# Patient Record
Sex: MALE | Race: WHITE | NOT HISPANIC OR LATINO
[De-identification: names, ages, dates, MRNs, and addresses within clinical notes are randomized per-mention and may not be internally consistent; named-entity substitution may affect disease eponyms.]

---

## 2021-03-11 ENCOUNTER — APPOINTMENT (OUTPATIENT)
Dept: SURGERY | Facility: CLINIC | Age: 61
End: 2021-03-11
Payer: COMMERCIAL

## 2021-03-11 VITALS
HEIGHT: 67 IN | OXYGEN SATURATION: 98 % | HEART RATE: 67 BPM | WEIGHT: 205 LBS | TEMPERATURE: 98.2 F | BODY MASS INDEX: 32.18 KG/M2 | SYSTOLIC BLOOD PRESSURE: 145 MMHG | DIASTOLIC BLOOD PRESSURE: 92 MMHG

## 2021-03-11 DIAGNOSIS — Z78.9 OTHER SPECIFIED HEALTH STATUS: ICD-10-CM

## 2021-03-11 DIAGNOSIS — Z81.8 FAMILY HISTORY OF OTHER MENTAL AND BEHAVIORAL DISORDERS: ICD-10-CM

## 2021-03-11 DIAGNOSIS — Z80.52 FAMILY HISTORY OF MALIGNANT NEOPLASM OF BLADDER: ICD-10-CM

## 2021-03-11 DIAGNOSIS — Z80.1 FAMILY HISTORY OF MALIGNANT NEOPLASM OF TRACHEA, BRONCHUS AND LUNG: ICD-10-CM

## 2021-03-11 PROCEDURE — 99072 ADDL SUPL MATRL&STAF TM PHE: CPT

## 2021-03-11 PROCEDURE — 19020 MASTOTOMY EXPL DRG ABSC DP: CPT | Mod: 59

## 2021-03-11 PROCEDURE — 99203 OFFICE O/P NEW LOW 30 MIN: CPT | Mod: 25

## 2021-03-11 NOTE — ASSESSMENT
[FreeTextEntry1] : Incision and drainage done with 5 cc of puss and sebum. Wound packed open and DSD applied

## 2021-03-11 NOTE — HISTORY OF PRESENT ILLNESS
[de-identified] : left chest abscess [de-identified] : 60 year old white male who presents c/o pain and swelling of his left chest for the last week. Pt states he has had a  small lump in the area for the past few months but it increased in size and became red over the last week. It is causing him pain but he denies fevers or chills. Without drainage from the area.

## 2021-03-11 NOTE — PHYSICAL EXAM
[JVD] : no jugular venous distention  [Normal Thyroid] : the thyroid was normal [Carotid Bruits] : no carotid bruits [Normal Breath Sounds] : Normal breath sounds [Wheezing] : wheezing was heard [Normal Rate and Rhythm] : normal rate and rhythm [Purpura] : no purpura  [Petechiae] : no petechiae [Skin Ulcer] : no ulcer [Skin Induration] : induration [Alert] : alert [Oriented to Person] : oriented to person [Oriented to Place] : oriented to place [Oriented to Time] : oriented to time [Calm] : calm [de-identified] : well developed white male in no acute distress [de-identified] : noninjected and nonicteric [de-identified] : without adenopathy [de-identified] : 4 by 4 cm left breast abscess [de-identified] : benign [de-identified] : without calf pain or swelling

## 2021-03-18 ENCOUNTER — APPOINTMENT (OUTPATIENT)
Dept: SURGERY | Facility: CLINIC | Age: 61
End: 2021-03-18
Payer: COMMERCIAL

## 2021-03-18 VITALS — TEMPERATURE: 98.2 F

## 2021-03-18 DIAGNOSIS — N61.1 ABSCESS OF THE BREAST AND NIPPLE: ICD-10-CM

## 2021-03-18 PROCEDURE — 99024 POSTOP FOLLOW-UP VISIT: CPT

## 2021-03-18 NOTE — HISTORY OF PRESENT ILLNESS
[de-identified] : left chest abscess s/p incision and drainage on 3/11/21 [de-identified] : Pt improved, decrease swelling and pain, without further drainage, and no fevers or chills.

## 2021-03-18 NOTE — PHYSICAL EXAM
[Normal Breath Sounds] : Normal breath sounds [Normal Heart Sounds] : normal heart sounds [Normal Rate and Rhythm] : normal rate and rhythm [Calm] : calm [de-identified] : well developed male in no acute distress [de-identified] : incision clean and closing, without erythema or drainage [de-identified] : benign

## 2021-03-19 LAB — BACTERIA WND CULT: ABNORMAL

## 2021-05-18 ENCOUNTER — APPOINTMENT (OUTPATIENT)
Dept: SURGERY | Facility: CLINIC | Age: 61
End: 2021-05-18
Payer: COMMERCIAL

## 2021-05-18 VITALS — TEMPERATURE: 99.4 F

## 2021-05-18 PROCEDURE — 99024 POSTOP FOLLOW-UP VISIT: CPT

## 2021-05-18 NOTE — HISTORY OF PRESENT ILLNESS
[de-identified] : left chest abscess s/p incision and drainage on 3/11/21 [de-identified] : pt to see if cyst to his chest has returned, He denies any pain, swelling or drainage.

## 2021-05-18 NOTE — PHYSICAL EXAM
[Normal Breath Sounds] : Normal breath sounds [Normal Heart Sounds] : normal heart sounds [Calm] : calm [de-identified] : well developed male in no acute distress [de-identified] : chest wall with thickened scar, not sure if cyst has returned [de-identified] : benign [de-identified] : without calf pain

## 2021-06-17 ENCOUNTER — APPOINTMENT (OUTPATIENT)
Dept: SURGERY | Facility: CLINIC | Age: 61
End: 2021-06-17
Payer: COMMERCIAL

## 2021-06-17 VITALS — TEMPERATURE: 98.6 F

## 2021-06-17 PROCEDURE — 99072 ADDL SUPL MATRL&STAF TM PHE: CPT

## 2021-06-17 PROCEDURE — 99212 OFFICE O/P EST SF 10 MIN: CPT

## 2021-06-17 NOTE — ASSESSMENT
[FreeTextEntry1] : Recommend elective excision of left breast cyst. All risks, benefits and options discussed.

## 2021-06-17 NOTE — PHYSICAL EXAM
[Normal Breath Sounds] : Normal breath sounds [Normal Heart Sounds] : normal heart sounds [Normal Rate and Rhythm] : normal rate and rhythm [Calm] : calm [de-identified] : without adenopathy [de-identified] : well developed male in no distress [de-identified] : positive for a reaccumulation of left breast sebaceous cyst [de-identified] : benign [de-identified] : without calf pain or swelling

## 2021-06-17 NOTE — HISTORY OF PRESENT ILLNESS
[de-identified] : left chest abscess s/p incision and drainage on 3/11/21 [de-identified] : pt for follow up of a left sebaceous cyst drainage, He does feel a  lump in the area.

## 2021-10-27 ENCOUNTER — OUTPATIENT (OUTPATIENT)
Dept: OUTPATIENT SERVICES | Facility: HOSPITAL | Age: 61
LOS: 1 days | End: 2021-10-27
Payer: COMMERCIAL

## 2021-10-27 DIAGNOSIS — Z98.890 OTHER SPECIFIED POSTPROCEDURAL STATES: Chronic | ICD-10-CM

## 2021-10-27 DIAGNOSIS — N44.00 TORSION OF TESTIS, UNSPECIFIED: Chronic | ICD-10-CM

## 2021-10-27 DIAGNOSIS — Z20.828 CONTACT WITH AND (SUSPECTED) EXPOSURE TO OTHER VIRAL COMMUNICABLE DISEASES: ICD-10-CM

## 2021-10-27 LAB — SARS-COV-2 RNA SPEC QL NAA+PROBE: SIGNIFICANT CHANGE UP

## 2021-10-27 PROCEDURE — U0003: CPT

## 2021-10-27 PROCEDURE — U0005: CPT

## 2021-10-28 ENCOUNTER — TRANSCRIPTION ENCOUNTER (OUTPATIENT)
Age: 61
End: 2021-10-28

## 2021-10-29 ENCOUNTER — RESULT REVIEW (OUTPATIENT)
Age: 61
End: 2021-10-29

## 2021-10-29 ENCOUNTER — APPOINTMENT (OUTPATIENT)
Dept: SURGERY | Facility: HOSPITAL | Age: 61
End: 2021-10-29

## 2021-10-29 ENCOUNTER — OUTPATIENT (OUTPATIENT)
Dept: OUTPATIENT SERVICES | Facility: HOSPITAL | Age: 61
LOS: 1 days | End: 2021-10-29
Payer: COMMERCIAL

## 2021-10-29 DIAGNOSIS — Z98.890 OTHER SPECIFIED POSTPROCEDURAL STATES: Chronic | ICD-10-CM

## 2021-10-29 DIAGNOSIS — N44.00 TORSION OF TESTIS, UNSPECIFIED: Chronic | ICD-10-CM

## 2021-10-29 DIAGNOSIS — D48.1 NEOPLASM OF UNCERTAIN BEHAVIOR OF CONNECTIVE AND OTHER SOFT TISSUE: ICD-10-CM

## 2021-10-29 PROCEDURE — 88304 TISSUE EXAM BY PATHOLOGIST: CPT | Mod: 26

## 2021-10-29 PROCEDURE — 88304 TISSUE EXAM BY PATHOLOGIST: CPT

## 2021-10-29 PROCEDURE — 19120 REMOVAL OF BREAST LESION: CPT | Mod: LT

## 2021-11-02 LAB — SURGICAL PATHOLOGY STUDY: SIGNIFICANT CHANGE UP

## 2021-11-04 ENCOUNTER — APPOINTMENT (OUTPATIENT)
Dept: SURGERY | Facility: CLINIC | Age: 61
End: 2021-11-04
Payer: COMMERCIAL

## 2021-11-04 VITALS — TEMPERATURE: 98.7 F

## 2021-11-04 PROCEDURE — 99024 POSTOP FOLLOW-UP VISIT: CPT

## 2021-11-04 NOTE — HISTORY OF PRESENT ILLNESS
[de-identified] : excision of left breast lesion on 10/29/21 [de-identified] : pt without pain, swelling or drainage.

## 2021-11-04 NOTE — ASSESSMENT
[FreeTextEntry1] : I have discussed with pt that his pathology came back a hypertrophic scar and that he must be followed to make sure he does not develop a keloid

## 2021-11-04 NOTE — PHYSICAL EXAM
[Normal Breath Sounds] : Normal breath sounds [Normal Heart Sounds] : normal heart sounds [Calm] : calm [de-identified] : well developed male in no distress [de-identified] : incision clean and closed, without erythema or swelling [de-identified] : benign

## 2021-11-15 ENCOUNTER — APPOINTMENT (OUTPATIENT)
Dept: SURGERY | Facility: CLINIC | Age: 61
End: 2021-11-15
Payer: COMMERCIAL

## 2021-11-15 VITALS — TEMPERATURE: 97.2 F

## 2021-11-15 DIAGNOSIS — N60.02 SOLITARY CYST OF LEFT BREAST: ICD-10-CM

## 2021-11-15 PROCEDURE — 99024 POSTOP FOLLOW-UP VISIT: CPT

## 2021-11-15 NOTE — HISTORY OF PRESENT ILLNESS
[de-identified] : excision of left breast lesion on 10/29/21 [de-identified] : pt without new complaints, following to make sure keloid does not form.

## 2021-11-15 NOTE — PHYSICAL EXAM
[Normal Breath Sounds] : Normal breath sounds [Normal Heart Sounds] : normal heart sounds [Calm] : calm [de-identified] : well developed male in no distress [de-identified] : incision clean and closed, without keloid

## 2021-12-09 ENCOUNTER — APPOINTMENT (OUTPATIENT)
Dept: SURGERY | Facility: CLINIC | Age: 61
End: 2021-12-09

## 2022-12-15 ENCOUNTER — EMERGENCY (EMERGENCY)
Facility: HOSPITAL | Age: 62
LOS: 1 days | Discharge: HOME | End: 2022-12-15
Attending: EMERGENCY MEDICINE | Admitting: EMERGENCY MEDICINE
Payer: COMMERCIAL

## 2022-12-15 VITALS
TEMPERATURE: 98 F | SYSTOLIC BLOOD PRESSURE: 126 MMHG | DIASTOLIC BLOOD PRESSURE: 87 MMHG | RESPIRATION RATE: 17 BRPM | OXYGEN SATURATION: 98 % | HEART RATE: 85 BPM

## 2022-12-15 VITALS
RESPIRATION RATE: 18 BRPM | HEART RATE: 69 BPM | OXYGEN SATURATION: 99 % | TEMPERATURE: 97 F | DIASTOLIC BLOOD PRESSURE: 98 MMHG | WEIGHT: 205.03 LBS | SYSTOLIC BLOOD PRESSURE: 165 MMHG

## 2022-12-15 DIAGNOSIS — R07.9 CHEST PAIN, UNSPECIFIED: ICD-10-CM

## 2022-12-15 DIAGNOSIS — N44.00 TORSION OF TESTIS, UNSPECIFIED: Chronic | ICD-10-CM

## 2022-12-15 DIAGNOSIS — Z98.890 OTHER SPECIFIED POSTPROCEDURAL STATES: Chronic | ICD-10-CM

## 2022-12-15 LAB
ALBUMIN SERPL ELPH-MCNC: 3.8 G/DL — SIGNIFICANT CHANGE UP (ref 3.3–5)
ALP SERPL-CCNC: 68 U/L — SIGNIFICANT CHANGE UP (ref 40–120)
ALT FLD-CCNC: 38 U/L — SIGNIFICANT CHANGE UP (ref 12–78)
ANION GAP SERPL CALC-SCNC: 4 MMOL/L — LOW (ref 5–17)
AST SERPL-CCNC: 31 U/L — SIGNIFICANT CHANGE UP (ref 15–37)
BASOPHILS # BLD AUTO: 0.06 K/UL — SIGNIFICANT CHANGE UP (ref 0–0.2)
BASOPHILS NFR BLD AUTO: 1.1 % — SIGNIFICANT CHANGE UP (ref 0–2)
BILIRUB SERPL-MCNC: 0.5 MG/DL — SIGNIFICANT CHANGE UP (ref 0.2–1.2)
BUN SERPL-MCNC: 18 MG/DL — SIGNIFICANT CHANGE UP (ref 7–23)
CALCIUM SERPL-MCNC: 9.6 MG/DL — SIGNIFICANT CHANGE UP (ref 8.5–10.1)
CHLORIDE SERPL-SCNC: 108 MMOL/L — SIGNIFICANT CHANGE UP (ref 96–108)
CK MB BLD-MCNC: 1.6 % — SIGNIFICANT CHANGE UP (ref 0–3.5)
CK MB CFR SERPL CALC: 2.7 NG/ML — SIGNIFICANT CHANGE UP (ref 0–3.6)
CK SERPL-CCNC: 166 U/L — SIGNIFICANT CHANGE UP (ref 26–308)
CO2 SERPL-SCNC: 29 MMOL/L — SIGNIFICANT CHANGE UP (ref 22–31)
CREAT SERPL-MCNC: 0.87 MG/DL — SIGNIFICANT CHANGE UP (ref 0.5–1.3)
EGFR: 99 ML/MIN/1.73M2 — SIGNIFICANT CHANGE UP
EOSINOPHIL # BLD AUTO: 0.09 K/UL — SIGNIFICANT CHANGE UP (ref 0–0.5)
EOSINOPHIL NFR BLD AUTO: 1.6 % — SIGNIFICANT CHANGE UP (ref 0–6)
FLUAV AG NPH QL: SIGNIFICANT CHANGE UP
FLUBV AG NPH QL: SIGNIFICANT CHANGE UP
GLUCOSE SERPL-MCNC: 98 MG/DL — SIGNIFICANT CHANGE UP (ref 70–99)
HCT VFR BLD CALC: 44.6 % — SIGNIFICANT CHANGE UP (ref 39–50)
HGB BLD-MCNC: 15.1 G/DL — SIGNIFICANT CHANGE UP (ref 13–17)
IMM GRANULOCYTES NFR BLD AUTO: 0.2 % — SIGNIFICANT CHANGE UP (ref 0–0.9)
LYMPHOCYTES # BLD AUTO: 1.57 K/UL — SIGNIFICANT CHANGE UP (ref 1–3.3)
LYMPHOCYTES # BLD AUTO: 28.3 % — SIGNIFICANT CHANGE UP (ref 13–44)
MCHC RBC-ENTMCNC: 29.7 PG — SIGNIFICANT CHANGE UP (ref 27–34)
MCHC RBC-ENTMCNC: 33.9 GM/DL — SIGNIFICANT CHANGE UP (ref 32–36)
MCV RBC AUTO: 87.6 FL — SIGNIFICANT CHANGE UP (ref 80–100)
MONOCYTES # BLD AUTO: 0.52 K/UL — SIGNIFICANT CHANGE UP (ref 0–0.9)
MONOCYTES NFR BLD AUTO: 9.4 % — SIGNIFICANT CHANGE UP (ref 2–14)
NEUTROPHILS # BLD AUTO: 3.29 K/UL — SIGNIFICANT CHANGE UP (ref 1.8–7.4)
NEUTROPHILS NFR BLD AUTO: 59.4 % — SIGNIFICANT CHANGE UP (ref 43–77)
NRBC # BLD: 0 /100 WBCS — SIGNIFICANT CHANGE UP (ref 0–0)
PLATELET # BLD AUTO: 237 K/UL — SIGNIFICANT CHANGE UP (ref 150–400)
POTASSIUM SERPL-MCNC: 4 MMOL/L — SIGNIFICANT CHANGE UP (ref 3.5–5.3)
POTASSIUM SERPL-SCNC: 4 MMOL/L — SIGNIFICANT CHANGE UP (ref 3.5–5.3)
PROT SERPL-MCNC: 7.3 G/DL — SIGNIFICANT CHANGE UP (ref 6–8.3)
RBC # BLD: 5.09 M/UL — SIGNIFICANT CHANGE UP (ref 4.2–5.8)
RBC # FLD: 11.9 % — SIGNIFICANT CHANGE UP (ref 10.3–14.5)
RSV RNA NPH QL NAA+NON-PROBE: SIGNIFICANT CHANGE UP
SARS-COV-2 RNA SPEC QL NAA+PROBE: SIGNIFICANT CHANGE UP
SODIUM SERPL-SCNC: 141 MMOL/L — SIGNIFICANT CHANGE UP (ref 135–145)
TROPONIN I, HIGH SENSITIVITY RESULT: 13.3 NG/L — SIGNIFICANT CHANGE UP
TROPONIN I, HIGH SENSITIVITY RESULT: 15.1 NG/L — SIGNIFICANT CHANGE UP
WBC # BLD: 5.54 K/UL — SIGNIFICANT CHANGE UP (ref 3.8–10.5)
WBC # FLD AUTO: 5.54 K/UL — SIGNIFICANT CHANGE UP (ref 3.8–10.5)

## 2022-12-15 PROCEDURE — 93005 ELECTROCARDIOGRAM TRACING: CPT

## 2022-12-15 PROCEDURE — 71045 X-RAY EXAM CHEST 1 VIEW: CPT

## 2022-12-15 PROCEDURE — 71045 X-RAY EXAM CHEST 1 VIEW: CPT | Mod: 26

## 2022-12-15 PROCEDURE — 99285 EMERGENCY DEPT VISIT HI MDM: CPT | Mod: 25

## 2022-12-15 PROCEDURE — 36415 COLL VENOUS BLD VENIPUNCTURE: CPT

## 2022-12-15 PROCEDURE — 82550 ASSAY OF CK (CPK): CPT

## 2022-12-15 PROCEDURE — 82553 CREATINE MB FRACTION: CPT

## 2022-12-15 PROCEDURE — 93010 ELECTROCARDIOGRAM REPORT: CPT

## 2022-12-15 PROCEDURE — 99285 EMERGENCY DEPT VISIT HI MDM: CPT

## 2022-12-15 PROCEDURE — 85025 COMPLETE CBC W/AUTO DIFF WBC: CPT

## 2022-12-15 PROCEDURE — 80053 COMPREHEN METABOLIC PANEL: CPT

## 2022-12-15 PROCEDURE — 84484 ASSAY OF TROPONIN QUANT: CPT

## 2022-12-15 PROCEDURE — 87637 SARSCOV2&INF A&B&RSV AMP PRB: CPT

## 2022-12-15 NOTE — ED PROVIDER NOTE - OBJECTIVE STATEMENT
62-year-old female with history of hypertension presents to the ED complaining of midsternal chest pain which occurred 1 hour prior to arrival while at sitting down at work.  Patient states that pain was sharp, constant with radiation into his neck and upper back.  Pain lasted 15 minutes then improved.  Patient still has mild chest discomfort.  Denies fever, chills, shortness of breath, cough, URI symptoms, abdominal pain, nausea, vomiting, syncope, upper or lower extremity weakness or paresthesias, diaphoresis.  No known cardiac history.  No previous cardiac work-up.  Non-smoker.    pmd: Dr. Padilla

## 2022-12-15 NOTE — CONSULT NOTE ADULT - NS ATTEND AMEND GEN_ALL_CORE FT
chest pain with some atypical features  2 sets of troponin are negative and ekg unremarkable  can dc home, and will be set up for coronary CTA  will start asa 81 daily

## 2022-12-15 NOTE — CONSULT NOTE ADULT - ASSESSMENT
62-year-old M with history of hypertension presents to the ED complaining of midsternal chest pain     Chest pain, r/o ACS  - Chest pain occurred around 11:30 pm while sitting down at work.  Patient states that pain was sharp constant pain that started at lower chest which then radiated to upper mid chest and into his neck and upper back. Pain lasted 15 minutes then improved. Pt now reports no symptoms.    - No known cardiac history but have h/o CAD for father and grand father.   - He does reports work stress, but nothing provoked the event.    - Prior cardiac work up almost 10 years back just routine.  - EKG showed SR @ 70. No acute ischemic changes noted.  - Continue Hydrochlorthiazide       Moustapha Cast, MS FNP, AGAP  Nurse Practitioner- Cardiology   Spectra #[phone]   62-year-old M with history of hypertension presents to the ED complaining of midsternal chest pain     Chest pain, r/o ACS  - Chest pain occurred around 11:30 pm while sitting down at work.  Patient states that pain was sharp constant pain that started at lower chest which then radiated to upper mid chest and into his neck and upper back. Pain lasted 15 minutes then improved. Pt now reports no symptoms.    - No known cardiac history but have h/o CAD for father and grand father.   - He does reports work stress, but nothing provoked the event.    - Prior cardiac work up almost 10 years back just routine.  - EKG showed SR @ 70. No acute ischemic changes noted.  - CK  <--166   - Troponin negative x 1. Follow up second set.  - CK is normal, suggesting against acute atherosclerotic plaque rupture.  - Continue Hydrochlorthiazide   - Patient stable from cardiac to be discharged home, if cardiac enzymes negative x 2   - Patient can follow up outpatient for further cardiac care and possibly CT coronaries       Moustapha Cast, MS FNP, [facility]  Nurse Practitioner- Cardiology   Spectra #[phone]

## 2022-12-15 NOTE — ED PROVIDER NOTE - CARE PROVIDER_API CALL
Alex Camp)  Cardiovascular Disease; Internal Medicine  [address], [place] [id number]  Phone: [phone]  Fax: [phone]  Follow Up Time: 1-3 Days

## 2022-12-15 NOTE — ED PROVIDER NOTE - NSICDXFAMILYHX_GEN_ALL_CORE_FT
FAMILY HISTORY:  Father  Still living? No  Hypertension, Age at diagnosis: Age Unknown    Grandparent  Still living? No  Family history of heart attack, Age at diagnosis: Age Unknown  Hypertension, Age at diagnosis: Age Unknown    Uncle  Still living? Yes, Estimated age: 72  Hypertension, Age at diagnosis: Age Unknown

## 2022-12-15 NOTE — ED ADULT NURSE NOTE - OBJECTIVE STATEMENT
Pt. received alert and oriented x3 with chief complain of sudden onset of chest pain radiating up towards throat for last three hours. EKG performed at bedside upon arrival. Pt. placed on continuous cardiac monitor.

## 2022-12-15 NOTE — ED PROVIDER NOTE - NSICDXPASTSURGICALHX_GEN_ALL_CORE_FT
PAST SURGICAL HISTORY:  H/O nasal septoplasty 1981    Testicular torsion pt. can't recall which side-1978

## 2022-12-15 NOTE — ED PROVIDER NOTE - PROGRESS NOTE DETAILS
Reevaluated patient at bedside.  Patient feeling much improved.  Discussed the results of all diagnostic testing in ED and copies of all available reports given.   An opportunity to ask questions was provided.  Discussed the importance of prompt, close medical follow-up.  Patient will return with any changes, concerns or persistent/worsening symptoms.  Understanding of all instructions verbalized. Patient evaluated  by cardiologist Dr. Camp, patient can be dced with close follow up with him. PAtient aware and agreeable with plan.

## 2022-12-15 NOTE — CONSULT NOTE ADULT - SUBJECTIVE AND OBJECTIVE BOX
[facility] - Dhara, Gregoria, Twyla, Otoniel, Chaitanya, Zoë; Office Number: [phone]    Initial Consult Note  CHIEF COMPLAINT: Patient is a 62y old  Male who presents with a chief complaint of chest pain   HPI: 62-year-old female with history of hypertension presents to the ED complaining of midsternal chest pain which occurred 1 hour prior to arrival while at sitting down at work.  Patient states that pain was sharp, constant with radiation into his neck and upper back.  Pain lasted 15 minutes then improved.  Patient still has mild chest discomfort.  Denies fever, chills, shortness of breath, cough, URI symptoms, abdominal pain, nausea, vomiting, syncope, upper or lower extremity weakness or paresthesias, diaphoresis.  No known cardiac history.  No previous cardiac work-up.     In ED, T(F): 97.3, HR: 69, BP: 165/98, RR: 18, SpO2: 99%.     Allergies    levofloxacin (Other)  penicillin (Hives)    Intolerances      PAST MEDICAL & SURGICAL HISTORY:  Prostatitis, chronic  &quot;it flares up a few times a year&quot; last occurrence? &quot;it&#x27;s been awhile, 6, maybe 9 months&quot;      Obese      H/O nasal septoplasty  1981      Testicular torsion  pt. can&#x27;t recall which side-1978        MEDICATIONS  (STANDING):    MEDICATIONS  (PRN):    FAMILY HISTORY:  Family history of heart attack (Grandparent)  Hypertension (Father, Grandparent, Uncle)  Hypertension (Father, Grandparent, Uncle)    SOCIAL HISTORY: No active tobacco, ethanol, or drug abuse.    REVIEW OF SYSTEMS   All other review of systems is negative unless indicated above.    VITAL SIGNS:   Vital Signs Last 24 Hrs  T(C): 36.3 (15 Dec 2022 12:51), Max: 36.3 (15 Dec 2022 12:51)  T(F): 97.3 (15 Dec 2022 12:51), Max: 97.3 (15 Dec 2022 12:51)  HR: 69 (15 Dec 2022 12:51) (69 - 69)  BP: 165/98 (15 Dec 2022 12:51) (165/98 - 165/98)  BP(mean): --  RR: 18 (15 Dec 2022 12:51) (18 - 18)  SpO2: 99% (15 Dec 2022 12:51) (99% - 99%)    Parameters below as of 15 Dec 2022 12:51  Patient On (Oxygen Delivery Method): room air    Physical Exam:  Constitutional: NAD, awake and alert, Well developed   HEENT: Moist Mucous Membranes, Anicteric  Pulmonary: Non-labored, breath sounds are clear bilaterally, No wheezing, rales or rhonchi  Cardiovascular: Regular, S1 and S2, No murmurs, No rubs, gallops or clicks  Gastrointestinal: Bowel Sounds present, soft, nontender.   Lymph: No peripheral edema. No lymphadenopathy.  Skin: No visible rashes or ulcers.  Psych:  Mood & affect appropriate    I&O's Summary      LABS: All Labs Reviewed:                        15.1   5.54  )-----------( 237      ( 15 Dec 2022 13:30 )             44.6    [facility] - Gregoria Jules, Twyla, Otoniel, Chaitanya, Zoë; Office Number: [phone]    Initial Consult Note  CHIEF COMPLAINT: Patient is a 62y old  Male who presents with a chief complaint of chest pain   HPI: 62-year-old female with history of hypertension presents to the ED complaining of midsternal chest pain which occurred 1 hour prior to arrival while at sitting down at work.  Patient states that pain was sharp, constant with radiation into his neck and upper back.  Pain lasted 15 minutes then improved.  Patient still has mild chest discomfort.  Denies fever, chills, shortness of breath, cough, URI symptoms, abdominal pain, nausea, vomiting, syncope, upper or lower extremity weakness or paresthesias, diaphoresis.  No known cardiac history.  No previous cardiac work-up.     In ED, T(F): 97.3, HR: 69, BP: 165/98, RR: 18, SpO2: 99%. EKG showed SR @ 70. No acute ischemic changes noted.     Allergies    levofloxacin (Other)  penicillin (Hives)    Intolerances      PAST MEDICAL & SURGICAL HISTORY:  Prostatitis, chronic  &quot;it flares up a few times a year&quot; last occurrence? &quot;it&#x27;s been awhile, 6, maybe 9 months&quot;      Obese      H/O nasal septoplasty  1981      Testicular torsion  pt. can&#x27;t recall which side-1978        MEDICATIONS  (STANDING):    MEDICATIONS  (PRN):    FAMILY HISTORY:  Family history of heart attack (Grandparent)  Hypertension (Father, Grandparent, Uncle)  Hypertension (Father, Grandparent, Uncle)    SOCIAL HISTORY: No active tobacco, ethanol, or drug abuse.    REVIEW OF SYSTEMS   All other review of systems is negative unless indicated above.    VITAL SIGNS:   Vital Signs Last 24 Hrs  T(C): 36.3 (15 Dec 2022 12:51), Max: 36.3 (15 Dec 2022 12:51)  T(F): 97.3 (15 Dec 2022 12:51), Max: 97.3 (15 Dec 2022 12:51)  HR: 69 (15 Dec 2022 12:51) (69 - 69)  BP: 165/98 (15 Dec 2022 12:51) (165/98 - 165/98)  BP(mean): --  RR: 18 (15 Dec 2022 12:51) (18 - 18)  SpO2: 99% (15 Dec 2022 12:51) (99% - 99%)    Parameters below as of 15 Dec 2022 12:51  Patient On (Oxygen Delivery Method): room air    Physical Exam:  Constitutional: NAD, awake and alert, Well developed   HEENT: Moist Mucous Membranes, Anicteric  Pulmonary: Non-labored, breath sounds are clear bilaterally, No wheezing, rales or rhonchi  Cardiovascular: Regular, S1 and S2, No murmurs, No rubs, gallops or clicks  Gastrointestinal: Bowel Sounds present, soft, nontender.   Lymph: No peripheral edema. No lymphadenopathy.  Skin: No visible rashes or ulcers.  Psych:  Mood & affect appropriate    I&O's Summary      LABS: All Labs Reviewed:                        15.1   5.54  )-----------( 237      ( 15 Dec 2022 13:30 )             44.6    [facility] - Gregoria Jules, Twyla, Otoniel, Chaitanya, Zoë; Office Number: [phone]    Initial Consult Note  CHIEF COMPLAINT: Patient is a 62y old  Male who presents with a chief complaint of chest pain   HPI: 62-year-old M with history of hypertension presents to the ED complaining of midsternal chest pain which occurred around 11:30 pm while sitting down at work.  Patient states that pain was sharp constant pain that started at lower chest which then radiated to upper mid chest and into his neck and upper back. Pain lasted 15 minutes then improved. Pt now reports no symptoms.  No known cardiac history but have h/o CAD for father and grand father. He does reports work stress, but nothing provoked the event.  Prior cardiac work up almost 10 years back just routine. Denies palpitation, orthopnea, PND, dizziness, lightheadedness, leg swelling.     In ED, T(F): 97.3, HR: 69, BP: 165/98, RR: 18, SpO2: 99%. EKG showed SR @ 70. No acute ischemic changes noted. Labs pending     Allergies    levofloxacin (Other)  penicillin (Hives)    Intolerances      PAST MEDICAL & SURGICAL HISTORY:  Prostatitis, chronic  &quot;it flares up a few times a year&quot; last occurrence? &quot;it&#x27;s been awhile, 6, maybe 9 months&quot;      Obese      H/O nasal septoplasty  1981      Testicular torsion  pt. can&#x27;t recall which side-1978        MEDICATIONS  (STANDING):    MEDICATIONS  (PRN):    FAMILY HISTORY:  Family history of heart attack (Grandparent)  Hypertension (Father, Grandparent, Uncle)  Hypertension (Father, Grandparent, Uncle)    SOCIAL HISTORY: No active tobacco, ethanol, or drug abuse.    REVIEW OF SYSTEMS   All other review of systems is negative unless indicated above.    VITAL SIGNS:   Vital Signs Last 24 Hrs  T(C): 36.3 (15 Dec 2022 12:51), Max: 36.3 (15 Dec 2022 12:51)  T(F): 97.3 (15 Dec 2022 12:51), Max: 97.3 (15 Dec 2022 12:51)  HR: 69 (15 Dec 2022 12:51) (69 - 69)  BP: 165/98 (15 Dec 2022 12:51) (165/98 - 165/98)  BP(mean): --  RR: 18 (15 Dec 2022 12:51) (18 - 18)  SpO2: 99% (15 Dec 2022 12:51) (99% - 99%)    Parameters below as of 15 Dec 2022 12:51  Patient On (Oxygen Delivery Method): room air    Physical Exam:  Constitutional: NAD, awake and alert, Well developed   HEENT: Moist Mucous Membranes, Anicteric  Pulmonary: Non-labored, breath sounds are clear bilaterally, No wheezing, rales or rhonchi  Cardiovascular: Regular, S1 and S2, No murmurs, No rubs, gallops or clicks  Gastrointestinal: Bowel Sounds present, soft, nontender.   Lymph: No peripheral edema. No lymphadenopathy.  Skin: No visible rashes or ulcers.  Psych:  Mood & affect appropriate    I&O's Summary      LABS: All Labs Reviewed:                        15.1   5.54  )-----------( 237      ( 15 Dec 2022 13:30 )             44.6

## 2022-12-15 NOTE — ED PROVIDER NOTE - CLINICAL SUMMARY MEDICAL DECISION MAKING FREE TEXT BOX
Patient is a 62-year-old male who presents to the emergency room with a chief complaints of chest pain.  Past medical history of hypertension.  Patient reports that around 1130 while sitting down at work he developed mid sternal chest pain.  Reports that the pain was sharp and constant and radiates to his upper mid chest and into his neck and upper back.  The episode lasted approximately 15 minutes and then improved.  Initially on arrival patient still had some mild chest discomfort but this seems to have resolved.  Denies fevers chills nausea vomiting shortness of breath cough abdominal pain extremity numbness or weakness.  Pain is not pleuritic.  Patient with no calf pain or swelling.  Patient has no known cardiac pathology. Patient does endorse that both his grandfathers had MIs at the age of 60.  On exam patient is lying in bed no acute distress normocephalic atraumatic pupils are equal round and reactive heart is regular rate lungs are clear to auscultation abdomen is soft nontender nondistended patient with a nonfocal neuro exam.  Patient presenting to the emergency room with an episode of sharp stabbing chest pain while at rest.  Low suspicion for cardiac pathology may have been related to gas pocket.  At this time symptoms have resolved.  Given patient's family history of cardiac disease this must be considered on the differential although less likely.  Will obtain screening labs cardiac markers EKG chest x-ray.  Given current climate will obtain viral swab.  Will consult with cardiology.  Labs noted patient with no elevated white count electrolytes within normal first set of cardiac markers negative viral swab negative.  Chest x-ray with no radiographic evidence of active chest disease.  EKG normal sinus.  Cardiology consult noted recommending repeat troponin.  If troponin is negative patient can be discharged with outpatient cardiac follow-up.  Repeat troponin negative patient remains symptom-free at this time stable for discharge home with outpatient cardiac follow-up.

## 2022-12-15 NOTE — ED PROVIDER NOTE - PATIENT PORTAL LINK FT
You can access the FollowMyHealth Patient Portal offered by [facility] by registering at the following website: [email or web address]. By joining [employer]’s FollowMyHealth portal, you will also be able to view your health information using other applications (apps) compatible with our system.

## 2022-12-15 NOTE — ED ADULT NURSE NOTE - HIV OFFER
Patient needs pre-op exam for EGD on 12/4/20 with  with MAC sedation for GERD/dysphagia and H&P within 30 days of procedure.     Place orders needed   Opt out

## 2022-12-15 NOTE — ED PROVIDER NOTE - NSICDXPASTMEDICALHX_GEN_ALL_CORE_FT
PAST MEDICAL HISTORY:  Obese     Prostatitis, chronic "it flares up a few times a year" last occurrence? "it's been awhile, 6, maybe 9 months"

## 2022-12-22 ENCOUNTER — APPOINTMENT (OUTPATIENT)
Dept: CT IMAGING | Facility: CLINIC | Age: 62
End: 2022-12-22
Payer: COMMERCIAL

## 2022-12-22 PROCEDURE — 75574 CT ANGIO HRT W/3D IMAGE: CPT

## 2022-12-30 ENCOUNTER — NON-APPOINTMENT (OUTPATIENT)
Age: 62
End: 2022-12-30

## 2022-12-30 ENCOUNTER — APPOINTMENT (OUTPATIENT)
Dept: CARDIOLOGY | Facility: CLINIC | Age: 62
End: 2022-12-30
Payer: COMMERCIAL

## 2022-12-30 VITALS
DIASTOLIC BLOOD PRESSURE: 85 MMHG | HEART RATE: 73 BPM | BODY MASS INDEX: 32.18 KG/M2 | HEIGHT: 67 IN | WEIGHT: 205 LBS | OXYGEN SATURATION: 100 % | SYSTOLIC BLOOD PRESSURE: 149 MMHG

## 2022-12-30 VITALS — SYSTOLIC BLOOD PRESSURE: 130 MMHG | DIASTOLIC BLOOD PRESSURE: 82 MMHG

## 2022-12-30 DIAGNOSIS — R07.89 OTHER CHEST PAIN: ICD-10-CM

## 2022-12-30 DIAGNOSIS — I10 ESSENTIAL (PRIMARY) HYPERTENSION: ICD-10-CM

## 2022-12-30 PROCEDURE — 93000 ELECTROCARDIOGRAM COMPLETE: CPT

## 2022-12-30 PROCEDURE — 99214 OFFICE O/P EST MOD 30 MIN: CPT | Mod: 25

## 2022-12-30 PROCEDURE — 93880 EXTRACRANIAL BILAT STUDY: CPT

## 2022-12-30 RX ORDER — DOXYCYCLINE 100 MG/1
100 CAPSULE ORAL TWICE DAILY
Qty: 14 | Refills: 0 | Status: DISCONTINUED | COMMUNITY
Start: 2021-03-11 | End: 2022-12-30

## 2022-12-30 RX ORDER — HYDROCHLOROTHIAZIDE 12.5 MG/1
12.5 CAPSULE ORAL
Refills: 0 | Status: ACTIVE | COMMUNITY

## 2022-12-30 NOTE — HISTORY OF PRESENT ILLNESS
[FreeTextEntry1] : Sanjeev is a 62-year-old male here for posthospitalization follow-up.\par \par His past medical history is notable for hypertension.  He does have a family history of CVA, and CAD in grandparents.  He is a non-smoker, and has no toxic habits.  His only medication is hydrochlorothiazide.\par \par On 12/15, he presented to the emergency room with chest pain.  There an EKG and blood work were unremarkable.  I subsequently set him up for a coronary CTA, which demonstrated a calcium score of 0, and only scattered minimal stenosis secondary to noncalcified plaque.\par \par He is doing well.  He knows that he needs to lose weight.  He has no chest pain, difficulty breathing, or palpitations.  He is doing push ups and has no worrisome symptoms.\par \par

## 2022-12-30 NOTE — DISCUSSION/SUMMARY
[EKG obtained to assist in diagnosis and management of assessed problem(s)] : EKG obtained to assist in diagnosis and management of assessed problem(s) [FreeTextEntry1] : Sanjeev recently presented with chest pain, with a normal evaluation.  His coronary CTA demonstrated a calcium score of 0, and only scattered minimal noncalcified plaque.\par \par He feels well today, without worrisome symptoms.  His blood pressure is mildly elevated, though improved on repeat evaluation.  His EKG demonstrates a sinus rhythm, without obvious ischemia or chamber enlargement.\par \par We discussed the importance of diet, exercise, and weight loss in detail.  This will improve his blood pressure and overall cardiovascular risk.  There may also be a component of sleep apnea, contributing to all of this.  I have requested a copy of his most recent blood work.  We will also do a carotid Doppler, for further risk stratification.\par We will speak after the above testing, and arrange follow-up.  If no issues, I will see him again in 6 months.

## 2023-05-21 ENCOUNTER — INPATIENT (INPATIENT)
Facility: HOSPITAL | Age: 63
LOS: 1 days | Discharge: HOME | DRG: 419 | End: 2023-05-23
Attending: SURGERY | Admitting: SURGERY
Payer: COMMERCIAL

## 2023-05-21 ENCOUNTER — TRANSCRIPTION ENCOUNTER (OUTPATIENT)
Age: 63
End: 2023-05-21

## 2023-05-21 VITALS
HEIGHT: 67 IN | DIASTOLIC BLOOD PRESSURE: 67 MMHG | HEART RATE: 77 BPM | WEIGHT: 210.1 LBS | OXYGEN SATURATION: 99 % | TEMPERATURE: 98 F | SYSTOLIC BLOOD PRESSURE: 142 MMHG | RESPIRATION RATE: 22 BRPM

## 2023-05-21 DIAGNOSIS — K80.50 CALCULUS OF BILE DUCT WITHOUT CHOLANGITIS OR CHOLECYSTITIS WITHOUT OBSTRUCTION: ICD-10-CM

## 2023-05-21 DIAGNOSIS — N44.00 TORSION OF TESTIS, UNSPECIFIED: Chronic | ICD-10-CM

## 2023-05-21 DIAGNOSIS — K80.20 CALCULUS OF GALLBLADDER WITHOUT CHOLECYSTITIS WITHOUT OBSTRUCTION: ICD-10-CM

## 2023-05-21 DIAGNOSIS — Z98.890 OTHER SPECIFIED POSTPROCEDURAL STATES: Chronic | ICD-10-CM

## 2023-05-21 LAB
ALBUMIN SERPL ELPH-MCNC: 4.2 G/DL — SIGNIFICANT CHANGE UP (ref 3.3–5)
ALP SERPL-CCNC: 75 U/L — SIGNIFICANT CHANGE UP (ref 40–120)
ALT FLD-CCNC: 47 U/L — SIGNIFICANT CHANGE UP (ref 12–78)
ANION GAP SERPL CALC-SCNC: 8 MMOL/L — SIGNIFICANT CHANGE UP (ref 5–17)
AST SERPL-CCNC: 32 U/L — SIGNIFICANT CHANGE UP (ref 15–37)
BASOPHILS # BLD AUTO: 0.03 K/UL — SIGNIFICANT CHANGE UP (ref 0–0.2)
BASOPHILS NFR BLD AUTO: 0.2 % — SIGNIFICANT CHANGE UP (ref 0–2)
BILIRUB SERPL-MCNC: 0.7 MG/DL — SIGNIFICANT CHANGE UP (ref 0.2–1.2)
BUN SERPL-MCNC: 18 MG/DL — SIGNIFICANT CHANGE UP (ref 7–23)
CALCIUM SERPL-MCNC: 10.1 MG/DL — SIGNIFICANT CHANGE UP (ref 8.5–10.1)
CHLORIDE SERPL-SCNC: 107 MMOL/L — SIGNIFICANT CHANGE UP (ref 96–108)
CO2 SERPL-SCNC: 25 MMOL/L — SIGNIFICANT CHANGE UP (ref 22–31)
CREAT SERPL-MCNC: 0.93 MG/DL — SIGNIFICANT CHANGE UP (ref 0.5–1.3)
EGFR: 93 ML/MIN/1.73M2 — SIGNIFICANT CHANGE UP
EOSINOPHIL # BLD AUTO: 0 K/UL — SIGNIFICANT CHANGE UP (ref 0–0.5)
EOSINOPHIL NFR BLD AUTO: 0 % — SIGNIFICANT CHANGE UP (ref 0–6)
GLUCOSE SERPL-MCNC: 165 MG/DL — HIGH (ref 70–99)
HCT VFR BLD CALC: 43.3 % — SIGNIFICANT CHANGE UP (ref 39–50)
HGB BLD-MCNC: 14.9 G/DL — SIGNIFICANT CHANGE UP (ref 13–17)
IMM GRANULOCYTES NFR BLD AUTO: 0.5 % — SIGNIFICANT CHANGE UP (ref 0–0.9)
LIDOCAIN IGE QN: 174 U/L — SIGNIFICANT CHANGE UP (ref 73–393)
LYMPHOCYTES # BLD AUTO: 0.7 K/UL — LOW (ref 1–3.3)
LYMPHOCYTES # BLD AUTO: 4.9 % — LOW (ref 13–44)
MAGNESIUM SERPL-MCNC: 1.9 MG/DL — SIGNIFICANT CHANGE UP (ref 1.6–2.6)
MCHC RBC-ENTMCNC: 29.4 PG — SIGNIFICANT CHANGE UP (ref 27–34)
MCHC RBC-ENTMCNC: 34.4 GM/DL — SIGNIFICANT CHANGE UP (ref 32–36)
MCV RBC AUTO: 85.4 FL — SIGNIFICANT CHANGE UP (ref 80–100)
MONOCYTES # BLD AUTO: 0.59 K/UL — SIGNIFICANT CHANGE UP (ref 0–0.9)
MONOCYTES NFR BLD AUTO: 4.1 % — SIGNIFICANT CHANGE UP (ref 2–14)
NEUTROPHILS # BLD AUTO: 12.83 K/UL — HIGH (ref 1.8–7.4)
NEUTROPHILS NFR BLD AUTO: 90.3 % — HIGH (ref 43–77)
NRBC # BLD: 0 /100 WBCS — SIGNIFICANT CHANGE UP (ref 0–0)
PLATELET # BLD AUTO: 233 K/UL — SIGNIFICANT CHANGE UP (ref 150–400)
POTASSIUM SERPL-MCNC: 3.7 MMOL/L — SIGNIFICANT CHANGE UP (ref 3.5–5.3)
POTASSIUM SERPL-SCNC: 3.7 MMOL/L — SIGNIFICANT CHANGE UP (ref 3.5–5.3)
PROT SERPL-MCNC: 8.1 G/DL — SIGNIFICANT CHANGE UP (ref 6–8.3)
RBC # BLD: 5.07 M/UL — SIGNIFICANT CHANGE UP (ref 4.2–5.8)
RBC # FLD: 12.4 % — SIGNIFICANT CHANGE UP (ref 10.3–14.5)
SODIUM SERPL-SCNC: 140 MMOL/L — SIGNIFICANT CHANGE UP (ref 135–145)
TROPONIN I, HIGH SENSITIVITY RESULT: 14.3 NG/L — SIGNIFICANT CHANGE UP
WBC # BLD: 14.22 K/UL — HIGH (ref 3.8–10.5)
WBC # FLD AUTO: 14.22 K/UL — HIGH (ref 3.8–10.5)

## 2023-05-21 PROCEDURE — 99221 1ST HOSP IP/OBS SF/LOW 40: CPT

## 2023-05-21 PROCEDURE — 74177 CT ABD & PELVIS W/CONTRAST: CPT | Mod: 26,MA

## 2023-05-21 PROCEDURE — 99285 EMERGENCY DEPT VISIT HI MDM: CPT

## 2023-05-21 PROCEDURE — 76705 ECHO EXAM OF ABDOMEN: CPT | Mod: 26

## 2023-05-21 PROCEDURE — 71045 X-RAY EXAM CHEST 1 VIEW: CPT | Mod: 26

## 2023-05-21 RX ORDER — SODIUM CHLORIDE 9 MG/ML
1000 INJECTION INTRAMUSCULAR; INTRAVENOUS; SUBCUTANEOUS ONCE
Refills: 0 | Status: COMPLETED | OUTPATIENT
Start: 2023-05-21 | End: 2023-05-21

## 2023-05-21 RX ORDER — ONDANSETRON 8 MG/1
4 TABLET, FILM COATED ORAL EVERY 4 HOURS
Refills: 0 | Status: DISCONTINUED | OUTPATIENT
Start: 2023-05-21 | End: 2023-05-21

## 2023-05-21 RX ORDER — ACETAMINOPHEN 500 MG
1000 TABLET ORAL EVERY 6 HOURS
Refills: 0 | Status: COMPLETED | OUTPATIENT
Start: 2023-05-21 | End: 2023-05-22

## 2023-05-21 RX ORDER — HYDROCHLOROTHIAZIDE 25 MG
1 TABLET ORAL
Refills: 0 | DISCHARGE

## 2023-05-21 RX ORDER — HYDROMORPHONE HYDROCHLORIDE 2 MG/ML
0.5 INJECTION INTRAMUSCULAR; INTRAVENOUS; SUBCUTANEOUS EVERY 4 HOURS
Refills: 0 | Status: DISCONTINUED | OUTPATIENT
Start: 2023-05-21 | End: 2023-05-22

## 2023-05-21 RX ORDER — MORPHINE SULFATE 50 MG/1
4 CAPSULE, EXTENDED RELEASE ORAL ONCE
Refills: 0 | Status: DISCONTINUED | OUTPATIENT
Start: 2023-05-21 | End: 2023-05-21

## 2023-05-21 RX ORDER — OXYCODONE AND ACETAMINOPHEN 5; 325 MG/1; MG/1
1 TABLET ORAL EVERY 6 HOURS
Refills: 0 | Status: DISCONTINUED | OUTPATIENT
Start: 2023-05-21 | End: 2023-05-22

## 2023-05-21 RX ORDER — ACETAMINOPHEN 500 MG
1000 TABLET ORAL EVERY 6 HOURS
Refills: 0 | Status: COMPLETED | OUTPATIENT
Start: 2023-05-21 | End: 2023-05-21

## 2023-05-21 RX ORDER — ONDANSETRON 8 MG/1
4 TABLET, FILM COATED ORAL ONCE
Refills: 0 | Status: COMPLETED | OUTPATIENT
Start: 2023-05-21 | End: 2023-05-21

## 2023-05-21 RX ORDER — SODIUM CHLORIDE 9 MG/ML
1000 INJECTION, SOLUTION INTRAVENOUS
Refills: 0 | Status: DISCONTINUED | OUTPATIENT
Start: 2023-05-21 | End: 2023-05-22

## 2023-05-21 RX ORDER — MEROPENEM 1 G/30ML
1000 INJECTION INTRAVENOUS EVERY 8 HOURS
Refills: 0 | Status: DISCONTINUED | OUTPATIENT
Start: 2023-05-21 | End: 2023-05-22

## 2023-05-21 RX ORDER — DIPHENHYDRAMINE HCL 50 MG
25 CAPSULE ORAL EVERY 12 HOURS
Refills: 0 | Status: DISCONTINUED | OUTPATIENT
Start: 2023-05-21 | End: 2023-05-22

## 2023-05-21 RX ORDER — ACETAMINOPHEN 500 MG
1000 TABLET ORAL EVERY 6 HOURS
Refills: 0 | Status: DISCONTINUED | OUTPATIENT
Start: 2023-05-21 | End: 2023-05-22

## 2023-05-21 RX ORDER — ONDANSETRON 8 MG/1
4 TABLET, FILM COATED ORAL EVERY 4 HOURS
Refills: 0 | Status: DISCONTINUED | OUTPATIENT
Start: 2023-05-21 | End: 2023-05-22

## 2023-05-21 RX ORDER — HYDROMORPHONE HYDROCHLORIDE 2 MG/ML
0.5 INJECTION INTRAMUSCULAR; INTRAVENOUS; SUBCUTANEOUS EVERY 6 HOURS
Refills: 0 | Status: DISCONTINUED | OUTPATIENT
Start: 2023-05-21 | End: 2023-05-21

## 2023-05-21 RX ORDER — HYDROMORPHONE HYDROCHLORIDE 2 MG/ML
0.25 INJECTION INTRAMUSCULAR; INTRAVENOUS; SUBCUTANEOUS ONCE
Refills: 0 | Status: DISCONTINUED | OUTPATIENT
Start: 2023-05-21 | End: 2023-05-21

## 2023-05-21 RX ORDER — MEROPENEM 1 G/30ML
1000 INJECTION INTRAVENOUS ONCE
Refills: 0 | Status: COMPLETED | OUTPATIENT
Start: 2023-05-21 | End: 2023-05-21

## 2023-05-21 RX ORDER — ACETAMINOPHEN 500 MG
1000 TABLET ORAL ONCE
Refills: 0 | Status: COMPLETED | OUTPATIENT
Start: 2023-05-21 | End: 2023-05-21

## 2023-05-21 RX ADMIN — SODIUM CHLORIDE 1000 MILLILITER(S): 9 INJECTION INTRAMUSCULAR; INTRAVENOUS; SUBCUTANEOUS at 09:33

## 2023-05-21 RX ADMIN — MEROPENEM 1000 MILLIGRAM(S): 1 INJECTION INTRAVENOUS at 13:37

## 2023-05-21 RX ADMIN — HYDROMORPHONE HYDROCHLORIDE 0.5 MILLIGRAM(S): 2 INJECTION INTRAMUSCULAR; INTRAVENOUS; SUBCUTANEOUS at 13:05

## 2023-05-21 RX ADMIN — HYDROMORPHONE HYDROCHLORIDE 0.5 MILLIGRAM(S): 2 INJECTION INTRAMUSCULAR; INTRAVENOUS; SUBCUTANEOUS at 22:25

## 2023-05-21 RX ADMIN — Medication 400 MILLIGRAM(S): at 23:37

## 2023-05-21 RX ADMIN — Medication 1000 MILLIGRAM(S): at 09:33

## 2023-05-21 RX ADMIN — ONDANSETRON 4 MILLIGRAM(S): 8 TABLET, FILM COATED ORAL at 12:49

## 2023-05-21 RX ADMIN — HYDROMORPHONE HYDROCHLORIDE 0.5 MILLIGRAM(S): 2 INJECTION INTRAMUSCULAR; INTRAVENOUS; SUBCUTANEOUS at 18:29

## 2023-05-21 RX ADMIN — Medication 400 MILLIGRAM(S): at 08:33

## 2023-05-21 RX ADMIN — Medication 1000 MILLIGRAM(S): at 18:23

## 2023-05-21 RX ADMIN — MEROPENEM 100 MILLIGRAM(S): 1 INJECTION INTRAVENOUS at 13:32

## 2023-05-21 RX ADMIN — HYDROMORPHONE HYDROCHLORIDE 0.25 MILLIGRAM(S): 2 INJECTION INTRAMUSCULAR; INTRAVENOUS; SUBCUTANEOUS at 16:39

## 2023-05-21 RX ADMIN — HYDROMORPHONE HYDROCHLORIDE 0.5 MILLIGRAM(S): 2 INJECTION INTRAMUSCULAR; INTRAVENOUS; SUBCUTANEOUS at 18:56

## 2023-05-21 RX ADMIN — SODIUM CHLORIDE 75 MILLILITER(S): 9 INJECTION, SOLUTION INTRAVENOUS at 13:19

## 2023-05-21 RX ADMIN — ONDANSETRON 4 MILLIGRAM(S): 8 TABLET, FILM COATED ORAL at 09:41

## 2023-05-21 RX ADMIN — HYDROMORPHONE HYDROCHLORIDE 0.5 MILLIGRAM(S): 2 INJECTION INTRAMUSCULAR; INTRAVENOUS; SUBCUTANEOUS at 12:50

## 2023-05-21 RX ADMIN — SODIUM CHLORIDE 2000 MILLILITER(S): 9 INJECTION INTRAMUSCULAR; INTRAVENOUS; SUBCUTANEOUS at 08:33

## 2023-05-21 RX ADMIN — MORPHINE SULFATE 4 MILLIGRAM(S): 50 CAPSULE, EXTENDED RELEASE ORAL at 09:41

## 2023-05-21 RX ADMIN — HYDROMORPHONE HYDROCHLORIDE 0.5 MILLIGRAM(S): 2 INJECTION INTRAMUSCULAR; INTRAVENOUS; SUBCUTANEOUS at 22:45

## 2023-05-21 RX ADMIN — MEROPENEM 100 MILLIGRAM(S): 1 INJECTION INTRAVENOUS at 21:43

## 2023-05-21 RX ADMIN — HYDROMORPHONE HYDROCHLORIDE 0.25 MILLIGRAM(S): 2 INJECTION INTRAMUSCULAR; INTRAVENOUS; SUBCUTANEOUS at 15:52

## 2023-05-21 RX ADMIN — MORPHINE SULFATE 4 MILLIGRAM(S): 50 CAPSULE, EXTENDED RELEASE ORAL at 12:43

## 2023-05-21 RX ADMIN — Medication 400 MILLIGRAM(S): at 17:23

## 2023-05-21 NOTE — ED PROVIDER NOTE - CLINICAL SUMMARY MEDICAL DECISION MAKING FREE TEXT BOX
Concern for biliary pathology versus pancreatitis given the localization of his symptoms.  Possible UTI versus renal stone given his polyuria.  History and exam is not consistent with SBO or appendicitis.  Plan to check lab work, provide medications, CT scan.

## 2023-05-21 NOTE — CONSULT NOTE ADULT - ASSESSMENT
63 year old M pmhx HTN, prostatitis, who presented with acute worsening RUQ pain since last night. Found to have acute cholecystitis. Leukocytosis of 14 likely reactive from this. No fevers and LFT's unrevealing. Has hx of penicillin allergy (hives) and adverse reaction to levofloxacin.    Discussed with patient benefits vs risks for trial of carbapenem, and its low cross-reactivity with penicillin, and he is agreeable with continuing meropenem. He is refusing to take any quinolone type of antibiotic.    -suggest meropenem 1g IV q8h, monitor for any reaction  -monitor WBC    Thank you for courtesy of this consult.     Will follow.  Discussed with ZACHARY Hanna MD  Division of Infectious Diseases   Cell [phone] between 8am and 6pm   After 6pm and weekends please call ID service at [phone].   
This is a 63y year old M pmhx HTN presents with 12h of RUQ pain and N/V. WBC14. LFTs WNL. US and CT show gallstones.

## 2023-05-21 NOTE — ED ADULT TRIAGE NOTE - CHIEF COMPLAINT QUOTE
Pt ambulatory to triage c/o RUQ abd pain since 11pm last night.  Pt is unable to sit still in triage, pacing at this time due to pain.  Pt reports ~ 10 episode of vomiting "bile" since onset of symptoms.  Denies diarrhea, abd soft, tender only to RUQ at this time, pain does not radiate to flank though reports "im inflamed down there, it does hurt to pee".   Abd soft.   Pt denies fevers.   Pt states he had salmon at a restaurant yesterday but no one else with similar symptoms. BN

## 2023-05-21 NOTE — ED PROVIDER NOTE - PROGRESS NOTE DETAILS
Patient seen by surgical team, recommend admission to their service for biliary colic.  Jose R Serna MD.

## 2023-05-21 NOTE — CONSULT NOTE ADULT - PROBLEM SELECTOR RECOMMENDATION 9
-Discussed with Dr. Vela, will be in to evaluate patient to decide conservative mgmt vs. OR   -NPO, IVF  -Pain management prn   -Anti-emetics prn   -STAT T&S and coags  -IV antibiotic reccs per pharmacy: merrem given PCN and fluoroquinolone allergy, mandatory ID consult placed     Spectralink: Ext: [phone]

## 2023-05-21 NOTE — H&P ADULT - NSHPLABSRESULTS_GEN_ALL_CORE
LABS:                        14.9   14.22 )-----------( 233      ( 21 May 2023 08:35 )             43.3     05-21    140  |  107  |  18  ----------------------------<  165<H>  3.7   |  25  |  0.93    Ca    10.1      21 May 2023 08:35  Mg     1.9     05-21    TPro  8.1  /  Alb  4.2  /  TBili  0.7  /  DBili  x   /  AST  32  /  ALT  47  /  AlkPhos  75  05-21      RADIOLOGY & ADDITIONAL TESTS:  < from: CT Abdomen and Pelvis w/ IV Cont (05.21.23 @ 09:56) >    FINDINGS:  LOWER CHEST: Within normal limits.    LIVER: Mild hepatic steatosis. Subcentimeter hypodensity in the right   hepatic lobe too small to characterize.  BILE DUCTS: Normal caliber.  GALLBLADDER: Gallstones within a mildly distended gallbladder.  SPLEEN: Within normal limits.  PANCREAS: Within normal limits.  ADRENALS: Within normal limits.  KIDNEYS/URETERS: Kidneys enhance bilaterally and symmetrically without   hydronephrosis. Nonspecific bilateral perinephric stranding. No renal   mass or renalcalculus bilaterally. The ureters are unremarkable.    BLADDER: Within normal limits.  REPRODUCTIVE ORGANS: Prostate within normal limits.    BOWEL: No bowel obstruction. Appendix is normal. Scattered colonic   diverticulosis without diverticulitis.  PERITONEUM: No ascites.  VESSELS: Within normal limits.  RETROPERITONEUM/LYMPH NODES: No lymphadenopathy.  ABDOMINAL WALL: Within normal limits.  BONES: Degenerative changes.    IMPRESSION:  Gallstones within a distended gallbladder.    No bowel obstruction or gross bowel wall thickening. Normal appendix.   Colonic diverticulosis without diverticulitis.    < end of copied text >    < from: US Abdomen Upper Quadrant Right (05.21.23 @ 11:05) >    FINDINGS:  Liver: Hepatic steatosis. Normal echotexture. No focal mass.  Bile ducts: Normal caliber. Common bile duct measures 0.5 cm..  Gallbladder: Cholelithiasis. No gallbladder wall thickening. No   sonographic Jacques sign.  Pancreas: Visualized portions are within normal limits.  Right kidney: 11 cm. No hydronephrosis.  Ascites: None.  IVC: Visualized portions are within normal limits.    IMPRESSION:  Cholelithiasis without secondary sonographic findings to definitively   suggest acute cholecystitis.        --- End of Report --  < end of copied text >

## 2023-05-21 NOTE — H&P ADULT - HISTORY OF PRESENT ILLNESS
This is a 63 year old M pmhx HTN, no significant surgical history, presenting with complaint of approx 12h of worsening RUQ pain, localized without radiation. Has not tried any analgesia or therapeutic measures for relief. Onset was a few hours after eating dinner which was his last meal. Unable to tolerate PO as of this morning 2/t nausea and multiple episodes of bilious, non-bloody vomiting. He denies ever experiencing symptoms such as these in the past. He has seen a GI doctor in the past, reports almost 10 years ago for an endoscopy and colonoscopy which he reports both were "normal." Continues to pass gas and have subjectively normal BMs. Denies fever/chills/sweats.

## 2023-05-21 NOTE — ED PROVIDER NOTE - PHYSICAL EXAMINATION
Constitutional: Awake, Alert, appears uncomfortable  HEAD: Normocephalic, atraumatic.   EYES: PERRL, EOM intact, conjunctiva and sclera are clear bilaterally.  ENT: External ears normal. No rhinorrhea, no tracheal deviation   NECK: Supple, non-tender  CARDIOVASCULAR: regular rate and rhythm.  RESPIRATORY: Normal respiratory effort; breath sounds CTAB, no wheezes, rhonchi, or rales. Speaking in full sentences. No accessory muscle use.   ABDOMEN: Soft; epigastric and RUQ TTP, non-distended. No rebound or guarding.   MSK:  no lower extremity edema, no deformities  SKIN: Warm, dry  NEURO: A&O x3. Sensory and motor functions are grossly intact. Speech is normal. No facial droop.  PSYCH: Appearance and judgement seem appropriate for gender and age.

## 2023-05-21 NOTE — PATIENT PROFILE ADULT - HAVE YOU HAD COVID IN THE LAST 60 DAYS?
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  Is been interrogated and evaluated in July by .  So no need to have been any interrogation today will allow Dr. Carson to manage this.   No

## 2023-05-21 NOTE — H&P ADULT - NSHPPHYSICALEXAM_GEN_ALL_CORE
GENERAL: Well developed, visibly uncomfortable   HEAD:  Atraumatic, normocephalic   CHEST/LUNG: Unlabored breathing   HEART: Regular rate and rhythm from previous vitals   ABDOMEN: Softly distended, +RUQ tenderness, +Jacques's, no guarding or rebound   NEUROLOGY: responding appropriately, no focal deficits

## 2023-05-21 NOTE — H&P ADULT - NSHPREVIEWOFSYSTEMS_GEN_ALL_CORE
Constitutional: Denies fever, fatigue or weight loss.  Skin: Denies rash.  Eyes: Denies recent vision problems or eye pain.  ENT: Denies congestion, ear pain, or sore throat.  Endocrine: Denies thyroid problems.  Cardiovascular: Denies chest pain or palpation.  Respiratory: Denies cough, shortness of breath, congestion, or wheezing.  Gastrointestinal: + abdominal pain, N/V, denies constipation, diarrhea, BRBPR, melena   Genitourinary: Denies dysuria.  Musculoskeletal: Denies joint swelling.  Neurologic: Denies headache.

## 2023-05-21 NOTE — H&P ADULT - ASSESSMENT
63y year old M pmhx HTN presents with 12h of RUQ pain and N/V. WBC14. LFTs WNL. US and CT show gallstones.

## 2023-05-21 NOTE — H&P ADULT - NSICDXPASTMEDICALHX_GEN_ALL_CORE_FT
PAST MEDICAL HISTORY:  Hypertension     Obese     Prostatitis, chronic "it flares up a few times a year" last occurrence? "it's been awhile, 6, maybe 9 months"

## 2023-05-21 NOTE — CONSULT NOTE ADULT - SUBJECTIVE AND OBJECTIVE BOX
[facility]  INFECTIOUS DISEASES - Rahat Paula, [address], [place] [address]  Tel: [phone]     Fax: [phone]  =======================================================    [facility]-[id number]  RAYRAY GUY     CC: Patient is a 63y old  Male who presents with a chief complaint of abdominal pain    HPI:  63 year old M pmhx HTN, prostatitis, who presented with acute worsening RUQ pain since last night. Associated with nausea and multiple episodes of bilious, non-bloody vomiting. He denies fevers, chills, diarrhea. Denies any sick contacts. Reports remote hx of hives after taking high doses of penicillin for Lyme, but no SOB or facial swelling. Also said he developed hx of shoulder injury related to levofloxacin.      PAST MEDICAL & SURGICAL HISTORY:  Prostatitis, chronic  "it flares up a few times a year" last occurrence? "it's been awhile, 6, maybe 9 months"      Obese      H/O nasal septoplasty  1981      Testicular torsion  pt. can't recall which side-1978          Social Hx:     FAMILY HISTORY:  Family history of heart attack (Grandparent)    Hypertension (Father, Grandparent, Uncle)        Allergies    penicillin (Hives)  levofloxacin (Other)    Intolerances        Antibiotics:  MEDICATIONS  (STANDING):  lactated ringers. 1000 milliLiter(s) (75 mL/Hr) IV Continuous <Continuous>    MEDICATIONS  (PRN):  acetaminophen   IVPB .. 1000 milliGRAM(s) IV Intermittent every 6 hours PRN Temp greater or equal to 38C (100.4F), Mild Pain (1 - 3)  acetaminophen   IVPB .. 1000 milliGRAM(s) IV Intermittent every 6 hours PRN Temp greater or equal to 38C (100.4F), Mild Pain (1 - 3)  HYDROmorphone  Injectable 0.5 milliGRAM(s) IV Push every 6 hours PRN Severe Pain (7 - 10)  oxycodone    5 mG/acetaminophen 325 mG 1 Tablet(s) Oral every 6 hours PRN Moderate Pain (4 - 6)       REVIEW OF SYSTEMS:  CONSTITUTIONAL:  No Fever or chills  HEENT:  No sore throat or runny nose.  CARDIOVASCULAR:  No chest pain or SOB.  RESPIRATORY:  No cough, shortness of breath  GASTROINTESTINAL:  see history  GENITOURINARY:  No dysuria, frequency or urgency  MUSCULOSKELETAL:  no back pain  SKIN:  No rash, no pruritus  NEUROLOGIC:  No headache or dizziness  PSYCHIATRIC:  No disorder of thought or mood.    Physical Exam:  Vital Signs Last 24 Hrs  T(C): 36.6 (21 May 2023 07:37), Max: 36.6 (21 May 2023 07:37)  T(F): 97.9 (21 May 2023 07:37), Max: 97.9 (21 May 2023 07:37)  HR: 77 (21 May 2023 07:37) (77 - 77)  BP: 142/67 (21 May 2023 07:37) (142/67 - 142/67)  BP(mean): --  RR: 22 (21 May 2023 07:37) (22 - 22)  SpO2: 99% (21 May 2023 07:37) (99% - 99%)    Parameters below as of 21 May 2023 07:37  Patient On (Oxygen Delivery Method): room air      Height (cm): 170.2 (05-21 @ 07:37)  Weight (kg): 95.3 (05-21 @ 07:37)  BMI (kg/m2): 32.9 (05-21 @ 07:37)  BSA (m2): 2.07 (05-21 @ 07:37)  GEN: NAD  HEENT: normocephalic and atraumatic.   NECK: Supple.   LUNGS: Clear to auscultation.  HEART: Regular rate and rhythm   ABDOMEN: (+) diffuse tenderness  EXTREMITIES: No leg edema.  NEUROLOGIC: grossly intact.  PSYCHIATRIC: Appropriate affect .  Labs:  05-21    140  |  107  |  18  ----------------------------<  165<H>  3.7   |  25  |  0.93    Ca    10.1      21 May 2023 08:35  Mg     1.9     05-21    TPro  8.1  /  Alb  4.2  /  TBili  0.7  /  DBili  x   /  AST  32  /  ALT  47  /  AlkPhos  75  05-21                          14.9   14.22 )-----------( 233      ( 21 May 2023 08:35 )             43.3         LIVER FUNCTIONS - ( 21 May 2023 08:35 )  Alb: 4.2 g/dL / Pro: 8.1 g/dL / ALK PHOS: 75 U/L / ALT: 47 U/L / AST: 32 U/L / GGT: x                           RECENT CULTURES:        All imaging and other data have been reviewed.    CT A/P with IV contrast:  FINDINGS:  LOWER CHEST: Within normal limits.    LIVER: Mild hepatic steatosis. Subcentimeter hypodensity in the right   hepatic lobe too small to characterize.  BILE DUCTS: Normal caliber.  GALLBLADDER: Gallstones within a mildly distended gallbladder.  SPLEEN: Within normal limits.  PANCREAS: Within normal limits.  ADRENALS: Within normal limits.  KIDNEYS/URETERS: Kidneys enhance bilaterally and symmetrically without   hydronephrosis. Nonspecific bilateral perinephric stranding. No renal   mass or renal calculus bilaterally. The ureters are unremarkable.    BLADDER: Within normal limits.  REPRODUCTIVE ORGANS: Prostate within normal limits.    BOWEL: No bowel obstruction. Appendix is normal. Scattered colonic   diverticulosis without diverticulitis.  PERITONEUM: No ascites.  VESSELS: Within normal limits.  RETROPERITONEUM/LYMPH NODES: No lymphadenopathy.  ABDOMINAL WALL: Within normal limits.  BONES: Degenerative changes.    IMPRESSION:  Gallstones within a distended gallbladder.    No bowel obstruction or gross bowel wall thickening. Normal appendix.   Colonic diverticulosis without diverticulitis.    
Surgery Consultation    This is a 63 year old M pmhx HTN, no significant surgical history, presenting with complaint of approx 12h of worsening RUQ pain, localized without radiation. Has not tried any analgesia or therapeutic measures for relief. Onset was a few hours after eating dinner which was his last meal. Unable to tolerate PO as of this morning 2/t nausea and multiple episodes of bilious, non-bloody vomiting. He denies ever experiencing symptoms such as these in the past. He has seen a GI doctor in the past, reports almost 10 years ago for an endoscopy and colonoscopy which he reports both were "normal." Continues to pass gas and have subjectively normal BMs. Denies fever/chills/sweats.     PAST MEDICAL & SURGICAL HISTORY:  Prostatitis, chronic  "it flares up a few times a year" last occurrence? "it's been awhile, 6, maybe 9 months"    Obese    H/O nasal septoplasty  1981    Testicular torsion  pt. can't recall which side-1978    Allergies:  penicillin (Hives)  levofloxacin (Other) - reports tendon rupture     Home Medications: HCTZ    FAMILY HISTORY:  Family history of heart attack (Grandparent)    Hypertension (Father, Grandparent, Uncle)    ROS:  Constitutional: Denies fever, fatigue or weight loss.  Skin: Denies rash.  Eyes: Denies recent vision problems or eye pain.  ENT: Denies congestion, ear pain, or sore throat.  Endocrine: Denies thyroid problems.  Cardiovascular: Denies chest pain or palpation.  Respiratory: Denies cough, shortness of breath, congestion, or wheezing.  Gastrointestinal: + abdominal pain, N/V, denies constipation, diarrhea, BRBPR, melena   Genitourinary: Denies dysuria.  Musculoskeletal: Denies joint swelling.  Neurologic: Denies headache.    PHYSICAL EXAM:  GENERAL: Well developed, visibly uncomfortable   HEAD:  Atraumatic, normocephalic   CHEST/LUNG: Unlabored breathing   HEART: Regular rate and rhythm from previous vitals   ABDOMEN: Softly distended, +RUQ tenderness, +Jacques's, no guarding or rebound   NEUROLOGY: responding appropriately, no focal deficits    Data:  T(C): 36.6 (05-21-23 @ 07:37), Max: 36.6 (05-21-23 @ 07:37)  HR: 77 (05-21-23 @ 07:37) (77 - 77)  BP: 142/67 (05-21-23 @ 07:37) (142/67 - 142/67)  RR: 22 (05-21-23 @ 07:37) (22 - 22)  SpO2: 99% (05-21-23 @ 07:37) (99% - 99%)                        14.9   14.22 )-----------( 233      ( 21 May 2023 08:35 )             43.3     05-21    140  |  107  |  18  ----------------------------<  165<H>  3.7   |  25  |  0.93    Ca    10.1      21 May 2023 08:35  Mg     1.9     05-21    TPro  8.1  /  Alb  4.2  /  TBili  0.7  /  DBili  x   /  AST  32  /  ALT  47  /  AlkPhos  75  05-21    LIVER FUNCTIONS - ( 21 May 2023 08:35 )  Alb: 4.2 g/dL / Pro: 8.1 g/dL / ALK PHOS: 75 U/L / ALT: 47 U/L / AST: 32 U/L / GGT: x           Radiology:  CT A/P: IMPRESSION:  Gallstones within a distended gallbladder.  No bowel obstruction or gross bowel wall thickening. Normal appendix.   Colonic diverticulosis without diverticulitis.    RUQ US: IMPRESSION:  Cholelithiasis without secondary sonographic findings to definitively   suggest acute cholecystitis.

## 2023-05-21 NOTE — ED PROVIDER NOTE - OBJECTIVE STATEMENT
Patient with a past medical history of hypertension is presenting with concern for abdominal pain.  Started last night after eating salmon.  Pain has been constant and localized to the right upper quadrant epigastric region.  Nothing is made him feel better or worse.  Has had nausea without vomiting.  No change in bowel movements.  Endorsing polyuria without dysuria.  No similar symptoms to this before.

## 2023-05-21 NOTE — H&P ADULT - PROBLEM SELECTOR PLAN 1
- GI consult in AM - r/o other causes of epigastric/RUQ pain  - NPO, IVF  - Pain management prn, anti-emetics prn   - IV antibiotic reccs per pharmacy: merrem given PCN and fluoroquinolone allergy, ID consult appreciated  - May possibly bring to OR tomorrow, pending GI eval  - Discussed w/ Dr. Vela - GI consult in AM - r/o other causes of epigastric/RUQ pain  - NPO, IVF  - Pain management prn, anti-emetics prn   - IV antibiotic reccs per pharmacy: given PCN and fluoroquinolone "allergy" - Meropenem started, ID consult appreciated  - May possibly bring to OR tomorrow, pending GI eval  - Discussed w/ Dr. Vela

## 2023-05-21 NOTE — ED ADULT NURSE NOTE - NSFALLUNIVINTERV_ED_ALL_ED
Bed/Stretcher in lowest position, wheels locked, appropriate side rails in place/Call bell, personal items and telephone in reach/Instruct patient to call for assistance before getting out of bed/chair/stretcher/Non-slip footwear applied when patient is off stretcher/[place] to call system/Physically safe environment - no spills, clutter or unnecessary equipment/Purposeful proactive rounding/Room/bathroom lighting operational, light cord in reach

## 2023-05-21 NOTE — PATIENT PROFILE ADULT - FALL HARM RISK - RISK INTERVENTIONS

## 2023-05-22 ENCOUNTER — RESULT REVIEW (OUTPATIENT)
Age: 63
End: 2023-05-22

## 2023-05-22 ENCOUNTER — TRANSCRIPTION ENCOUNTER (OUTPATIENT)
Age: 63
End: 2023-05-22

## 2023-05-22 LAB
ALBUMIN SERPL ELPH-MCNC: 3.2 G/DL — LOW (ref 3.3–5)
ALP SERPL-CCNC: 55 U/L — SIGNIFICANT CHANGE UP (ref 40–120)
ALT FLD-CCNC: 32 U/L — SIGNIFICANT CHANGE UP (ref 12–78)
ANION GAP SERPL CALC-SCNC: 6 MMOL/L — SIGNIFICANT CHANGE UP (ref 5–17)
APTT BLD: 27.8 SEC — SIGNIFICANT CHANGE UP (ref 27.5–35.5)
AST SERPL-CCNC: 21 U/L — SIGNIFICANT CHANGE UP (ref 15–37)
BASOPHILS # BLD AUTO: 0.16 K/UL — SIGNIFICANT CHANGE UP (ref 0–0.2)
BASOPHILS NFR BLD AUTO: 1 % — SIGNIFICANT CHANGE UP (ref 0–2)
BILIRUB SERPL-MCNC: 1.1 MG/DL — SIGNIFICANT CHANGE UP (ref 0.2–1.2)
BUN SERPL-MCNC: 22 MG/DL — SIGNIFICANT CHANGE UP (ref 7–23)
CALCIUM SERPL-MCNC: 8.9 MG/DL — SIGNIFICANT CHANGE UP (ref 8.5–10.1)
CHLORIDE SERPL-SCNC: 109 MMOL/L — HIGH (ref 96–108)
CO2 SERPL-SCNC: 27 MMOL/L — SIGNIFICANT CHANGE UP (ref 22–31)
CREAT SERPL-MCNC: 0.86 MG/DL — SIGNIFICANT CHANGE UP (ref 0.5–1.3)
EGFR: 99 ML/MIN/1.73M2 — SIGNIFICANT CHANGE UP
EOSINOPHIL # BLD AUTO: 0 K/UL — SIGNIFICANT CHANGE UP (ref 0–0.5)
EOSINOPHIL NFR BLD AUTO: 0 % — SIGNIFICANT CHANGE UP (ref 0–6)
GLUCOSE SERPL-MCNC: 119 MG/DL — HIGH (ref 70–99)
HCT VFR BLD CALC: 41.1 % — SIGNIFICANT CHANGE UP (ref 39–50)
HCV AB S/CO SERPL IA: 0.23 S/CO — SIGNIFICANT CHANGE UP (ref 0–0.99)
HCV AB SERPL-IMP: SIGNIFICANT CHANGE UP
HGB BLD-MCNC: 13.7 G/DL — SIGNIFICANT CHANGE UP (ref 13–17)
INR BLD: 1.12 RATIO — SIGNIFICANT CHANGE UP (ref 0.88–1.16)
LYMPHOCYTES # BLD AUTO: 1.8 K/UL — SIGNIFICANT CHANGE UP (ref 1–3.3)
LYMPHOCYTES # BLD AUTO: 11 % — LOW (ref 13–44)
MCHC RBC-ENTMCNC: 29.4 PG — SIGNIFICANT CHANGE UP (ref 27–34)
MCHC RBC-ENTMCNC: 33.3 GM/DL — SIGNIFICANT CHANGE UP (ref 32–36)
MCV RBC AUTO: 88.2 FL — SIGNIFICANT CHANGE UP (ref 80–100)
MONOCYTES # BLD AUTO: 1.64 K/UL — HIGH (ref 0–0.9)
MONOCYTES NFR BLD AUTO: 10 % — SIGNIFICANT CHANGE UP (ref 2–14)
NEUTROPHILS # BLD AUTO: 12.78 K/UL — HIGH (ref 1.8–7.4)
NEUTROPHILS NFR BLD AUTO: 78 % — HIGH (ref 43–77)
NRBC # BLD: SIGNIFICANT CHANGE UP /100 WBCS (ref 0–0)
PLATELET # BLD AUTO: 215 K/UL — SIGNIFICANT CHANGE UP (ref 150–400)
POTASSIUM SERPL-MCNC: 3.3 MMOL/L — LOW (ref 3.5–5.3)
POTASSIUM SERPL-SCNC: 3.3 MMOL/L — LOW (ref 3.5–5.3)
PROT SERPL-MCNC: 6.6 G/DL — SIGNIFICANT CHANGE UP (ref 6–8.3)
PROTHROM AB SERPL-ACNC: 13.1 SEC — SIGNIFICANT CHANGE UP (ref 10.5–13.4)
RBC # BLD: 4.66 M/UL — SIGNIFICANT CHANGE UP (ref 4.2–5.8)
RBC # FLD: 12.6 % — SIGNIFICANT CHANGE UP (ref 10.3–14.5)
SODIUM SERPL-SCNC: 142 MMOL/L — SIGNIFICANT CHANGE UP (ref 135–145)
WBC # BLD: 16.39 K/UL — HIGH (ref 3.8–10.5)
WBC # FLD AUTO: 16.39 K/UL — HIGH (ref 3.8–10.5)

## 2023-05-22 PROCEDURE — 99232 SBSQ HOSP IP/OBS MODERATE 35: CPT

## 2023-05-22 PROCEDURE — 78226 HEPATOBILIARY SYSTEM IMAGING: CPT | Mod: 26

## 2023-05-22 PROCEDURE — 88304 TISSUE EXAM BY PATHOLOGIST: CPT | Mod: 26

## 2023-05-22 PROCEDURE — 99231 SBSQ HOSP IP/OBS SF/LOW 25: CPT

## 2023-05-22 DEVICE — LIGATING CLIPS WECK HEMOLOK POLYMER MEDIUM-LARGE (GREEN) 6: Type: IMPLANTABLE DEVICE | Status: FUNCTIONAL

## 2023-05-22 DEVICE — SURGICEL SNOW 4 X 4": Type: IMPLANTABLE DEVICE | Status: FUNCTIONAL

## 2023-05-22 RX ORDER — HYDROMORPHONE HYDROCHLORIDE 2 MG/ML
0.5 INJECTION INTRAMUSCULAR; INTRAVENOUS; SUBCUTANEOUS EVERY 4 HOURS
Refills: 0 | Status: DISCONTINUED | OUTPATIENT
Start: 2023-05-22 | End: 2023-05-22

## 2023-05-22 RX ORDER — SODIUM CHLORIDE 9 MG/ML
1000 INJECTION, SOLUTION INTRAVENOUS
Refills: 0 | Status: DISCONTINUED | OUTPATIENT
Start: 2023-05-22 | End: 2023-05-23

## 2023-05-22 RX ORDER — OXYCODONE HYDROCHLORIDE 5 MG/1
5 TABLET ORAL EVERY 6 HOURS
Refills: 0 | Status: DISCONTINUED | OUTPATIENT
Start: 2023-05-22 | End: 2023-05-23

## 2023-05-22 RX ORDER — ONDANSETRON 8 MG/1
4 TABLET, FILM COATED ORAL EVERY 6 HOURS
Refills: 0 | Status: DISCONTINUED | OUTPATIENT
Start: 2023-05-22 | End: 2023-05-23

## 2023-05-22 RX ORDER — KETOROLAC TROMETHAMINE 30 MG/ML
15 SYRINGE (ML) INJECTION EVERY 6 HOURS
Refills: 0 | Status: DISCONTINUED | OUTPATIENT
Start: 2023-05-22 | End: 2023-05-22

## 2023-05-22 RX ORDER — OXYCODONE HYDROCHLORIDE 5 MG/1
5 TABLET ORAL ONCE
Refills: 0 | Status: DISCONTINUED | OUTPATIENT
Start: 2023-05-22 | End: 2023-05-23

## 2023-05-22 RX ORDER — ONDANSETRON 8 MG/1
4 TABLET, FILM COATED ORAL ONCE
Refills: 0 | Status: DISCONTINUED | OUTPATIENT
Start: 2023-05-22 | End: 2023-05-23

## 2023-05-22 RX ORDER — DIPHENHYDRAMINE HCL 50 MG
25 CAPSULE ORAL EVERY 12 HOURS
Refills: 0 | Status: DISCONTINUED | OUTPATIENT
Start: 2023-05-22 | End: 2023-05-22

## 2023-05-22 RX ORDER — HYDROMORPHONE HYDROCHLORIDE 2 MG/ML
0.5 INJECTION INTRAMUSCULAR; INTRAVENOUS; SUBCUTANEOUS
Refills: 0 | Status: DISCONTINUED | OUTPATIENT
Start: 2023-05-22 | End: 2023-05-23

## 2023-05-22 RX ORDER — MEROPENEM 1 G/30ML
1000 INJECTION INTRAVENOUS EVERY 8 HOURS
Refills: 0 | Status: DISCONTINUED | OUTPATIENT
Start: 2023-05-22 | End: 2023-05-23

## 2023-05-22 RX ORDER — POTASSIUM CHLORIDE 20 MEQ
40 PACKET (EA) ORAL EVERY 4 HOURS
Refills: 0 | Status: COMPLETED | OUTPATIENT
Start: 2023-05-22 | End: 2023-05-22

## 2023-05-22 RX ORDER — INDOCYANINE GREEN 25 MG
5 KIT INTRAVASCULAR; INTRAVENOUS ONCE
Refills: 0 | Status: DISCONTINUED | OUTPATIENT
Start: 2023-05-22 | End: 2023-05-22

## 2023-05-22 RX ORDER — INDOCYANINE GREEN 25 MG
1.88 KIT INTRAVASCULAR; INTRAVENOUS ONCE
Refills: 0 | Status: DISCONTINUED | OUTPATIENT
Start: 2023-05-22 | End: 2023-05-22

## 2023-05-22 RX ORDER — MORPHINE SULFATE 50 MG/1
3.8 CAPSULE, EXTENDED RELEASE ORAL ONCE
Refills: 0 | Status: DISCONTINUED | OUTPATIENT
Start: 2023-05-22 | End: 2023-05-22

## 2023-05-22 RX ORDER — FAMOTIDINE 10 MG/ML
20 INJECTION INTRAVENOUS DAILY
Refills: 0 | Status: DISCONTINUED | OUTPATIENT
Start: 2023-05-22 | End: 2023-05-22

## 2023-05-22 RX ORDER — MORPHINE SULFATE 50 MG/1
2 CAPSULE, EXTENDED RELEASE ORAL EVERY 4 HOURS
Refills: 0 | Status: DISCONTINUED | OUTPATIENT
Start: 2023-05-22 | End: 2023-05-23

## 2023-05-22 RX ORDER — HEPARIN SODIUM 5000 [USP'U]/ML
5000 INJECTION INTRAVENOUS; SUBCUTANEOUS EVERY 8 HOURS
Refills: 0 | Status: DISCONTINUED | OUTPATIENT
Start: 2023-05-22 | End: 2023-05-23

## 2023-05-22 RX ORDER — ACETAMINOPHEN 500 MG
650 TABLET ORAL EVERY 6 HOURS
Refills: 0 | Status: DISCONTINUED | OUTPATIENT
Start: 2023-05-22 | End: 2023-05-23

## 2023-05-22 RX ADMIN — Medication 400 MILLIGRAM(S): at 17:15

## 2023-05-22 RX ADMIN — Medication 1000 MILLIGRAM(S): at 05:45

## 2023-05-22 RX ADMIN — Medication 40 MILLIEQUIVALENT(S): at 17:14

## 2023-05-22 RX ADMIN — SODIUM CHLORIDE 75 MILLILITER(S): 9 INJECTION, SOLUTION INTRAVENOUS at 21:44

## 2023-05-22 RX ADMIN — HEPARIN SODIUM 5000 UNIT(S): 5000 INJECTION INTRAVENOUS; SUBCUTANEOUS at 23:18

## 2023-05-22 RX ADMIN — Medication 1000 MILLIGRAM(S): at 00:00

## 2023-05-22 RX ADMIN — HYDROMORPHONE HYDROCHLORIDE 0.5 MILLIGRAM(S): 2 INJECTION INTRAMUSCULAR; INTRAVENOUS; SUBCUTANEOUS at 05:11

## 2023-05-22 RX ADMIN — Medication 400 MILLIGRAM(S): at 11:17

## 2023-05-22 RX ADMIN — HYDROMORPHONE HYDROCHLORIDE 0.5 MILLIGRAM(S): 2 INJECTION INTRAMUSCULAR; INTRAVENOUS; SUBCUTANEOUS at 12:00

## 2023-05-22 RX ADMIN — SODIUM CHLORIDE 75 MILLILITER(S): 9 INJECTION, SOLUTION INTRAVENOUS at 23:16

## 2023-05-22 RX ADMIN — MORPHINE SULFATE 3.8 MILLIGRAM(S): 50 CAPSULE, EXTENDED RELEASE ORAL at 15:04

## 2023-05-22 RX ADMIN — FAMOTIDINE 20 MILLIGRAM(S): 10 INJECTION INTRAVENOUS at 11:20

## 2023-05-22 RX ADMIN — Medication 400 MILLIGRAM(S): at 05:11

## 2023-05-22 RX ADMIN — MEROPENEM 100 MILLIGRAM(S): 1 INJECTION INTRAVENOUS at 05:11

## 2023-05-22 RX ADMIN — HYDROMORPHONE HYDROCHLORIDE 0.5 MILLIGRAM(S): 2 INJECTION INTRAMUSCULAR; INTRAVENOUS; SUBCUTANEOUS at 05:45

## 2023-05-22 RX ADMIN — Medication 40 MILLIEQUIVALENT(S): at 13:05

## 2023-05-22 RX ADMIN — MEROPENEM 100 MILLIGRAM(S): 1 INJECTION INTRAVENOUS at 12:15

## 2023-05-22 RX ADMIN — HYDROMORPHONE HYDROCHLORIDE 0.5 MILLIGRAM(S): 2 INJECTION INTRAMUSCULAR; INTRAVENOUS; SUBCUTANEOUS at 12:14

## 2023-05-22 NOTE — ASU PREOP CHECKLIST - BSA (M2)
Assumed care of patient, bedside report received from Shantel. Updated on POC, call light within reach and fall precautions in place. Bed locked and in lowest position. Patient instructed to call for assistance before getting out of bed. All questions answered, no other needs at this time.      2.07

## 2023-05-22 NOTE — PHARMACOTHERAPY INTERVENTION NOTE - COMMENTS
Modified penicillin allergy to state patient tolerated meropenem during this admission.    Jose Daniel Cast, PharmD  Clinical Pharmacy Specialist, Infectious Diseases  Tele-Antimicrobial Stewardship Program (Tele-ASP)  Tele-ASP Phone: [phone]

## 2023-05-22 NOTE — BRIEF OPERATIVE NOTE - NSICDXBRIEFPROCEDURE_GEN_ALL_CORE_FT
PROCEDURES:  Robot-assisted cholecystectomy with cholangiography 22-May-2023 21:32:55  Camacho Vela

## 2023-05-23 ENCOUNTER — TRANSCRIPTION ENCOUNTER (OUTPATIENT)
Age: 63
End: 2023-05-23

## 2023-05-23 VITALS
TEMPERATURE: 98 F | SYSTOLIC BLOOD PRESSURE: 122 MMHG | DIASTOLIC BLOOD PRESSURE: 72 MMHG | OXYGEN SATURATION: 93 % | RESPIRATION RATE: 18 BRPM | HEART RATE: 86 BPM

## 2023-05-23 LAB
ALBUMIN SERPL ELPH-MCNC: 2.9 G/DL — LOW (ref 3.3–5)
ALP SERPL-CCNC: 52 U/L — SIGNIFICANT CHANGE UP (ref 40–120)
ALT FLD-CCNC: 56 U/L — SIGNIFICANT CHANGE UP (ref 12–78)
ANION GAP SERPL CALC-SCNC: 4 MMOL/L — LOW (ref 5–17)
AST SERPL-CCNC: 54 U/L — HIGH (ref 15–37)
BASOPHILS # BLD AUTO: 0.02 K/UL — SIGNIFICANT CHANGE UP (ref 0–0.2)
BASOPHILS NFR BLD AUTO: 0.1 % — SIGNIFICANT CHANGE UP (ref 0–2)
BILIRUB SERPL-MCNC: 0.8 MG/DL — SIGNIFICANT CHANGE UP (ref 0.2–1.2)
BUN SERPL-MCNC: 21 MG/DL — SIGNIFICANT CHANGE UP (ref 7–23)
CALCIUM SERPL-MCNC: 8.8 MG/DL — SIGNIFICANT CHANGE UP (ref 8.5–10.1)
CHLORIDE SERPL-SCNC: 108 MMOL/L — SIGNIFICANT CHANGE UP (ref 96–108)
CO2 SERPL-SCNC: 29 MMOL/L — SIGNIFICANT CHANGE UP (ref 22–31)
CREAT SERPL-MCNC: 0.9 MG/DL — SIGNIFICANT CHANGE UP (ref 0.5–1.3)
EGFR: 97 ML/MIN/1.73M2 — SIGNIFICANT CHANGE UP
EOSINOPHIL # BLD AUTO: 0 K/UL — SIGNIFICANT CHANGE UP (ref 0–0.5)
EOSINOPHIL NFR BLD AUTO: 0 % — SIGNIFICANT CHANGE UP (ref 0–6)
GLUCOSE SERPL-MCNC: 150 MG/DL — HIGH (ref 70–99)
HCT VFR BLD CALC: 37.7 % — LOW (ref 39–50)
HGB BLD-MCNC: 12.7 G/DL — LOW (ref 13–17)
IMM GRANULOCYTES NFR BLD AUTO: 0.6 % — SIGNIFICANT CHANGE UP (ref 0–0.9)
LYMPHOCYTES # BLD AUTO: 0.67 K/UL — LOW (ref 1–3.3)
LYMPHOCYTES # BLD AUTO: 4.8 % — LOW (ref 13–44)
MAGNESIUM SERPL-MCNC: 2.2 MG/DL — SIGNIFICANT CHANGE UP (ref 1.6–2.6)
MCHC RBC-ENTMCNC: 29.6 PG — SIGNIFICANT CHANGE UP (ref 27–34)
MCHC RBC-ENTMCNC: 33.7 GM/DL — SIGNIFICANT CHANGE UP (ref 32–36)
MCV RBC AUTO: 87.9 FL — SIGNIFICANT CHANGE UP (ref 80–100)
MONOCYTES # BLD AUTO: 1.31 K/UL — HIGH (ref 0–0.9)
MONOCYTES NFR BLD AUTO: 9.3 % — SIGNIFICANT CHANGE UP (ref 2–14)
NEUTROPHILS # BLD AUTO: 11.95 K/UL — HIGH (ref 1.8–7.4)
NEUTROPHILS NFR BLD AUTO: 85.2 % — HIGH (ref 43–77)
NRBC # BLD: 0 /100 WBCS — SIGNIFICANT CHANGE UP (ref 0–0)
PHOSPHATE SERPL-MCNC: 1 MG/DL — CRITICAL LOW (ref 2.5–4.5)
PLATELET # BLD AUTO: 205 K/UL — SIGNIFICANT CHANGE UP (ref 150–400)
POTASSIUM SERPL-MCNC: 3.7 MMOL/L — SIGNIFICANT CHANGE UP (ref 3.5–5.3)
POTASSIUM SERPL-SCNC: 3.7 MMOL/L — SIGNIFICANT CHANGE UP (ref 3.5–5.3)
PROT SERPL-MCNC: 6.3 G/DL — SIGNIFICANT CHANGE UP (ref 6–8.3)
RBC # BLD: 4.29 M/UL — SIGNIFICANT CHANGE UP (ref 4.2–5.8)
RBC # FLD: 12.4 % — SIGNIFICANT CHANGE UP (ref 10.3–14.5)
SODIUM SERPL-SCNC: 141 MMOL/L — SIGNIFICANT CHANGE UP (ref 135–145)
WBC # BLD: 14.03 K/UL — HIGH (ref 3.8–10.5)
WBC # FLD AUTO: 14.03 K/UL — HIGH (ref 3.8–10.5)

## 2023-05-23 PROCEDURE — 99233 SBSQ HOSP IP/OBS HIGH 50: CPT

## 2023-05-23 PROCEDURE — 99285 EMERGENCY DEPT VISIT HI MDM: CPT

## 2023-05-23 PROCEDURE — 86900 BLOOD TYPING SEROLOGIC ABO: CPT

## 2023-05-23 PROCEDURE — 86901 BLOOD TYPING SEROLOGIC RH(D): CPT

## 2023-05-23 PROCEDURE — 74177 CT ABD & PELVIS W/CONTRAST: CPT | Mod: MA

## 2023-05-23 PROCEDURE — 84484 ASSAY OF TROPONIN QUANT: CPT

## 2023-05-23 PROCEDURE — 96361 HYDRATE IV INFUSION ADD-ON: CPT

## 2023-05-23 PROCEDURE — 96376 TX/PRO/DX INJ SAME DRUG ADON: CPT

## 2023-05-23 PROCEDURE — 96375 TX/PRO/DX INJ NEW DRUG ADDON: CPT

## 2023-05-23 PROCEDURE — 96365 THER/PROPH/DIAG IV INF INIT: CPT

## 2023-05-23 PROCEDURE — 96367 TX/PROPH/DG ADDL SEQ IV INF: CPT

## 2023-05-23 PROCEDURE — 88304 TISSUE EXAM BY PATHOLOGIST: CPT

## 2023-05-23 PROCEDURE — 85730 THROMBOPLASTIN TIME PARTIAL: CPT

## 2023-05-23 PROCEDURE — 71045 X-RAY EXAM CHEST 1 VIEW: CPT

## 2023-05-23 PROCEDURE — 86803 HEPATITIS C AB TEST: CPT

## 2023-05-23 PROCEDURE — S2900: CPT

## 2023-05-23 PROCEDURE — 36415 COLL VENOUS BLD VENIPUNCTURE: CPT

## 2023-05-23 PROCEDURE — 86850 RBC ANTIBODY SCREEN: CPT

## 2023-05-23 PROCEDURE — C1889: CPT

## 2023-05-23 PROCEDURE — 84100 ASSAY OF PHOSPHORUS: CPT

## 2023-05-23 PROCEDURE — 83690 ASSAY OF LIPASE: CPT

## 2023-05-23 PROCEDURE — 80053 COMPREHEN METABOLIC PANEL: CPT

## 2023-05-23 PROCEDURE — 76705 ECHO EXAM OF ABDOMEN: CPT

## 2023-05-23 PROCEDURE — A9537: CPT

## 2023-05-23 PROCEDURE — 85610 PROTHROMBIN TIME: CPT

## 2023-05-23 PROCEDURE — 78226 HEPATOBILIARY SYSTEM IMAGING: CPT

## 2023-05-23 PROCEDURE — 93005 ELECTROCARDIOGRAM TRACING: CPT

## 2023-05-23 PROCEDURE — 85025 COMPLETE CBC W/AUTO DIFF WBC: CPT

## 2023-05-23 PROCEDURE — 83735 ASSAY OF MAGNESIUM: CPT

## 2023-05-23 RX ORDER — CEFPODOXIME PROXETIL 100 MG
200 TABLET ORAL ONCE
Refills: 0 | Status: COMPLETED | OUTPATIENT
Start: 2023-05-23 | End: 2023-05-23

## 2023-05-23 RX ORDER — IBUPROFEN 200 MG
1 TABLET ORAL
Qty: 20 | Refills: 0
Start: 2023-05-23

## 2023-05-23 RX ORDER — ACETAMINOPHEN 500 MG
2 TABLET ORAL
Qty: 20 | Refills: 0
Start: 2023-05-23

## 2023-05-23 RX ORDER — POTASSIUM PHOSPHATE, MONOBASIC POTASSIUM PHOSPHATE, DIBASIC 236; 224 MG/ML; MG/ML
30 INJECTION, SOLUTION INTRAVENOUS ONCE
Refills: 0 | Status: COMPLETED | OUTPATIENT
Start: 2023-05-23 | End: 2023-05-23

## 2023-05-23 RX ORDER — DOCUSATE SODIUM 100 MG
1 CAPSULE ORAL
Qty: 20 | Refills: 0
Start: 2023-05-23

## 2023-05-23 RX ORDER — OXYCODONE HYDROCHLORIDE 5 MG/1
1 TABLET ORAL
Qty: 10 | Refills: 0
Start: 2023-05-23

## 2023-05-23 RX ADMIN — SODIUM CHLORIDE 110 MILLILITER(S): 9 INJECTION, SOLUTION INTRAVENOUS at 01:59

## 2023-05-23 RX ADMIN — Medication 200 MILLIGRAM(S): at 14:00

## 2023-05-23 RX ADMIN — OXYCODONE HYDROCHLORIDE 5 MILLIGRAM(S): 5 TABLET ORAL at 13:03

## 2023-05-23 RX ADMIN — Medication 650 MILLIGRAM(S): at 02:37

## 2023-05-23 RX ADMIN — Medication 650 MILLIGRAM(S): at 02:07

## 2023-05-23 RX ADMIN — MEROPENEM 100 MILLIGRAM(S): 1 INJECTION INTRAVENOUS at 05:23

## 2023-05-23 RX ADMIN — HEPARIN SODIUM 5000 UNIT(S): 5000 INJECTION INTRAVENOUS; SUBCUTANEOUS at 05:23

## 2023-05-23 RX ADMIN — HEPARIN SODIUM 5000 UNIT(S): 5000 INJECTION INTRAVENOUS; SUBCUTANEOUS at 14:00

## 2023-05-23 RX ADMIN — POTASSIUM PHOSPHATE, MONOBASIC POTASSIUM PHOSPHATE, DIBASIC 83.33 MILLIMOLE(S): 236; 224 INJECTION, SOLUTION INTRAVENOUS at 14:00

## 2023-05-23 NOTE — CARE COORDINATION ASSESSMENT. - NSPASTMEDSURGHISTORY_GEN_ALL_CORE_FT
PAST MEDICAL & SURGICAL HISTORY:  Obese      Prostatitis, chronic  "it flares up a few times a year" last occurrence? "it's been awhile, 6, maybe 9 months"      Testicular torsion  pt. can't recall which side-1978      H/O nasal septoplasty  1981      Hypertension

## 2023-05-23 NOTE — CARE COORDINATION ASSESSMENT. - OTHER PERTINENT DISCHARGE PLANNING INFORMATION:
The admitted for lap margarita. Lives with children and future ex wife. Independent with no needs. CM role explained and DC planning discussed.

## 2023-05-23 NOTE — DISCHARGE NOTE PROVIDER - CARE PROVIDER_API CALL
Camacho Vela (MD)  Surgery  [address] [place] [address]  Phone: [phone]  Fax: [phone]  Follow Up Time:

## 2023-05-23 NOTE — DISCHARGE NOTE PROVIDER - NSDCCPCAREPLAN_GEN_ALL_CORE_FT
PRINCIPAL DISCHARGE DIAGNOSIS  Diagnosis: Gangrenous cholecystitis  Assessment and Plan of Treatment:       SECONDARY DISCHARGE DIAGNOSES  Diagnosis: HTN (hypertension)  Assessment and Plan of Treatment:

## 2023-05-23 NOTE — CARE COORDINATION ASSESSMENT. - NSCAREPROVIDERS_GEN_ALL_CORE_FT
CARE PROVIDERS:  Accepting Physician: Camacho Vela  Administration: Tyler Gomez  Admitting: Camacho Vela  Attending: Camacho Vela  Case Management: Maricel Medina  Consultant: Jose R Vidales  Consultant: Vern Chen  Consultant: Mi Henrandez  Consultant: Bhavani Hanna  Consultant: Lj Hagan  Consultant: Chad Vega  Consultant: Romelia Fregoso  ED Attending: Jose R Serna  ED Nurse: Adolfo Austin  Nurse: Nicole Lee  Nurse: Lavern Bauer  Nurse: Odalys Hammer  Ordered: ServiceAccount, SCMMLM  Ordered: ADM, User  Ordered: Jawandha, Malachi  Ordered: Doctor, Unknown  Override: Nicole Lee  Override: Papi Davila  Override: Christy Barcenas  Override: Riley Bueno  Override: Angel Lawrence  Override: Lavern Bauer  PCA/Nursing Assistant: Nikia Swann  PCA/Nursing Assistant: Kristen Villalpando  Primary Team: Romina Escalera  Primary Team: Lorena Nichols  Primary Team: Vern Mast  Primary Team: Vern Townsend  Primary Team: Camacho Vela  Primary Team: Kelly Cole  Registered Dietitian: Sophia Jenkins// Supp. Assoc.: Isis Villareal

## 2023-05-23 NOTE — PROGRESS NOTE ADULT - ASSESSMENT
RUQ pain, with gallstones. Increasing pain and WBC  will get HIDA
62 yo M, pmh HTN, presented with one day of RUQ pain found to have cholelithiasis, now s/p robo raad with findings of gangrenous cholecystitis     Plan:  - IV abx  - CLD, advance in AM  - dc tomorrow if doing well
63 year old M pmhx HTN, prostatitis, who presented with acute worsening RUQ pain since last night. Found to have acute gangrenous cholecystitis, s/p Robot-assisted cholecystectomy with cholangiography last night. Has hx of penicillin allergy (hives) and adverse reaction to levofloxacin, but tolerating meropenem well.    Had fever yesterday prior to cholecystectomy, suspect this is related to underlying cholecystitis prior to source control. Otherwise no fever since and leukocytosis improved today.    -continue meropenem 1g IV q8h, suggest trial of cefpodoxime prior to discharge  -suggest cefpodoxime 200mg PO BID and Flagyl 500mg PO TID x 5 days upon discharge  -monitor for a few hours for reaction after administration of cefpodoxime  -consider blood cultures  -monitor fever curve  -discussed with Dr. Dane Hanna MD  Division of Infectious Diseases   Cell [phone] between 8am and 6pm   After 6pm and weekends please call ID service at [phone]. 
63 year old M pmhx HTN, prostatitis, who presented with acute worsening RUQ pain since last night. Found to have acute cholecystitis. Leukocytosis of 14 likely reactive from this. No fevers and LFT's unrevealing. Has hx of penicillin allergy (hives) and adverse reaction to levofloxacin.    He has no fever but WBC increased to 16 today. Plan for HIDA scan per Surgery pending plan for any surgical intervention. Tolerating meropenem well.    -continue meropenem 1g IV q8h  -follow up HIDA scan  -monitor WBC    Bhavani Hanna MD  Division of Infectious Diseases   Cell [phone] between 8am and 6pm   After 6pm and weekends please call ID service at [phone].

## 2023-05-23 NOTE — DISCHARGE NOTE NURSING/CASE MANAGEMENT/SOCIAL WORK - PATIENT PORTAL LINK FT
You can access the FollowMyHealth Patient Portal offered by [facility] by registering at the following website: [email or web address]. By joining [facility]’s FollowMyHealth portal, you will also be able to view your health information using other applications (apps) compatible with our system.

## 2023-05-23 NOTE — DISCHARGE NOTE PROVIDER - NSDCFUADDINST_GEN_ALL_CORE_FT
Please continue a low fat diet on discharge. This diet consists of lean meats, beans, grains, fruits, vegetables and products considered low in fat, such as low-fat dairy. Aviod foods contianing lots of butter or oil, fried foods and processed meats.   You may shower; soap and water over incision sites. Do not scrub. Pat dry when done. No tub bathing or swimming until cleared. Keep incision sites out of the sun as scars will darken. Ambulate as tolerated, but no heavy lifting (>10lbs) or strenuous exercise. You should be urinating at least 3-4x per day. Call the office if you experience increasing abdominal pain, nausea, vomiting, or temperature >101 F.

## 2023-05-23 NOTE — DISCHARGE NOTE PROVIDER - NSDCMRMEDTOKEN_GEN_ALL_CORE_FT
hydroCHLOROthiazide 12.5 mg oral tablet: 1 tab(s) orally once a day  Oxaydo 5 mg oral tablet: 1-2 tab(s) orally every 4 hours, As Needed MDD:8 - for severe pain  Zofran ODT 4 mg oral tablet, disintegratin tab(s) orally 3 times a day, As Needed MDD:3 - for nausea   cefpodoxime 200 mg oral tablet: 1 tab(s) orally every 12 hours  Colace 100 mg oral capsule: 1 cap(s) orally 3 times a day  hydroCHLOROthiazide 12.5 mg oral tablet: 1 tab(s) orally once a day  ibuprofen 600 mg oral tablet: 1 tab(s) orally every 6 hours MDD: 3200mg  metroNIDAZOLE 500 mg oral tablet: 1 tab(s) orally 3 times a day (before meals)  Roxicodone 5 mg oral tablet: 1 tab(s) orally every 6 hours as needed for  severe pain MDD: 4 tablets daily  Tylenol Extra Strength 500 mg oral tablet: 2 tab(s) orally every 6 hours MDD: 4000mg  Zofran ODT 4 mg oral tablet, disintegratin tab(s) orally 3 times a day, As Needed MDD:3 - for nausea

## 2023-05-23 NOTE — DISCHARGE NOTE PROVIDER - NSDCCPTREATMENT_GEN_ALL_CORE_FT
PRINCIPAL PROCEDURE  Procedure: Robot-assisted cholecystectomy with cholangiography  Findings and Treatment:

## 2023-05-23 NOTE — PROGRESS NOTE ADULT - SUBJECTIVE AND OBJECTIVE BOX
[facility]  INFECTIOUS DISEASES - Rahat Paula, [address], [place] [address]  Tel: [phone]     Fax: [phone]  =======================================================    RAYRAY GUY [id number]    Follow up: No fevers. Still with R sided abdominal pain, although does not radiate. Reports some nausea when pain meds wear off. Denies any diarrhea. Tolerating current antibiotics.    Allergies:  penicillin (Hives)  levofloxacin (Other)      Antibiotics:  acetaminophen   IVPB .. 1000 milliGRAM(s) IV Intermittent every 6 hours  acetaminophen   IVPB .. 1000 milliGRAM(s) IV Intermittent every 6 hours PRN  diphenhydrAMINE Injectable 25 milliGRAM(s) IV Push every 12 hours PRN  famotidine Injectable 20 milliGRAM(s) IV Push daily  hydrochlorothiazide 12.5 milliGRAM(s) Oral daily  HYDROmorphone  Injectable 0.5 milliGRAM(s) IV Push every 4 hours PRN  ketorolac   Injectable 15 milliGRAM(s) IV Push every 6 hours PRN  lactated ringers. 1000 milliLiter(s) IV Continuous <Continuous>  meropenem  IVPB 1000 milliGRAM(s) IV Intermittent every 8 hours  ondansetron Injectable 4 milliGRAM(s) IV Push every 4 hours PRN  potassium chloride    Tablet ER 40 milliEquivalent(s) Oral every 4 hours       REVIEW OF SYSTEMS:  CONSTITUTIONAL:  No Fever or chills  HEENT:  No sore throat or runny nose.  CARDIOVASCULAR:  No chest pain or SOB.  RESPIRATORY:  No cough, shortness of breath  GASTROINTESTINAL:  see history  GENITOURINARY:  No dysuria, frequency or urgency  MUSCULOSKELETAL:  no back pain  SKIN:  No rash, no pruritus  NEUROLOGIC:  No headache or dizziness  PSYCHIATRIC:  No disorder of thought or mood.     Physical Exam:  ICU Vital Signs Last 24 Hrs  T(C): 37.5 (22 May 2023 12:24), Max: 37.5 (22 May 2023 12:24)  T(F): 99.5 (22 May 2023 12:24), Max: 99.5 (22 May 2023 12:24)  HR: 69 (22 May 2023 12:24) (69 - 79)  BP: 124/79 (22 May 2023 12:24) (117/73 - 133/89)  BP(mean): --  ABP: --  ABP(mean): --  RR: 20 (22 May 2023 12:24) (18 - 20)  SpO2: 94% (22 May 2023 12:24) (94% - 98%)    O2 Parameters below as of 22 May 2023 12:24  Patient On (Oxygen Delivery Method): room air           GEN: NAD  HEENT: normocephalic and atraumatic.   NECK: Supple.   LUNGS: Normal respiratory effort  HEART: Regular rate and rhythm   ABDOMEN: R sided tenderness  EXTREMITIES: No leg edema.  NEUROLOGIC: grossly intact.  PSYCHIATRIC: Appropriate affect .    Labs:  05-22    142  |  109<H>  |  22  ----------------------------<  119<H>  3.3<L>   |  27  |  0.86    Ca    8.9      22 May 2023 06:48  Mg     1.9     05-21    TPro  6.6  /  Alb  3.2<L>  /  TBili  1.1  /  DBili  x   /  AST  21  /  ALT  32  /  AlkPhos  55  05-22                          13.7   16.39 )-----------( 215      ( 22 May 2023 06:48 )             41.1         LIVER FUNCTIONS - ( 22 May 2023 06:48 )  Alb: 3.2 g/dL / Pro: 6.6 g/dL / ALK PHOS: 55 U/L / ALT: 32 U/L / AST: 21 U/L / GGT: x             RECENT CULTURES:        All imaging and data are reviewed.   
[facility]  INFECTIOUS DISEASES - Rahat Paula, [address], [place] [address]  Tel: [phone]     Fax: [phone]  =======================================================    RAYRAY GUY [id number]    Follow up: Fever of 101.3 yesterday right before cholecystectomy. Feels a little "wiped out" but abdominal pain less. No other new complaints.    Allergies:  penicillin (Hives)  levofloxacin (Other)      Antibiotics:  acetaminophen     Tablet .. 650 milliGRAM(s) Oral every 6 hours PRN  dextrose 5% + sodium chloride 0.45%. 1000 milliLiter(s) IV Continuous <Continuous>  heparin   Injectable 5000 Unit(s) SubCutaneous every 8 hours  meropenem  IVPB 1000 milliGRAM(s) IV Intermittent every 8 hours  morphine  - Injectable 2 milliGRAM(s) IV Push every 4 hours PRN  ondansetron Injectable 4 milliGRAM(s) IV Push every 6 hours PRN  oxyCODONE    IR 5 milliGRAM(s) Oral every 6 hours PRN       REVIEW OF SYSTEMS:  CONSTITUTIONAL:  (+) fever  HEENT:  No sore throat or runny nose.  CARDIOVASCULAR:  No chest pain or SOB.  RESPIRATORY:  No cough, shortness of breath  GASTROINTESTINAL:  see history  GENITOURINARY:  No dysuria, frequency or urgency  MUSCULOSKELETAL:  no back pain  SKIN:  No rash, no pruritus  NEUROLOGIC:  No dizziness  PSYCHIATRIC:  No disorder of thought or mood.     Physical Exam:  ICU Vital Signs Last 24 Hrs  T(C): 36.7 (23 May 2023 12:15), Max: 38.5 (22 May 2023 18:08)  T(F): 98.1 (23 May 2023 12:15), Max: 101.3 (22 May 2023 18:08)  HR: 88 (23 May 2023 12:15) (71 - 102)  BP: 117/69 (23 May 2023 12:15) (106/64 - 151/84)  BP(mean): --  ABP: --  ABP(mean): --  RR: 18 (23 May 2023 12:15) (14 - 20)  SpO2: 88% (23 May 2023 12:15) (88% - 97%)    O2 Parameters below as of 23 May 2023 12:15  Patient On (Oxygen Delivery Method): room air           GEN: NAD  HEENT: normocephalic and atraumatic.  NECK: Supple.  No lymphadenopathy   LUNGS: Normal respiratory effor  HEART: Regular rate and rhythm   ABDOMEN: Soft, nontender, and nondistended. (+) laparoscopic incisions  EXTREMITIES: No leg edema.  NEUROLOGIC: grossly intact.  PSYCHIATRIC: Appropriate affect .  SKIN: No ulceration or induration present.      Labs:  05-23    141  |  108  |  21  ----------------------------<  150<H>  3.7   |  29  |  0.90    Ca    8.8      23 May 2023 05:58  Phos  1.0     05-23  Mg     2.2     05-23    TPro  6.3  /  Alb  2.9<L>  /  TBili  0.8  /  DBili  x   /  AST  54<H>  /  ALT  56  /  AlkPhos  52  05-23                          12.7   14.03 )-----------( 205      ( 23 May 2023 05:58 )             37.7     PT/INR - ( 22 May 2023 16:35 )   PT: 13.1 sec;   INR: 1.12 ratio         PTT - ( 22 May 2023 16:35 )  PTT:27.8 sec    LIVER FUNCTIONS - ( 23 May 2023 05:58 )  Alb: 2.9 g/dL / Pro: 6.3 g/dL / ALK PHOS: 52 U/L / ALT: 56 U/L / AST: 54 U/L / GGT: x             RECENT CULTURES:        All imaging and data are reviewed.   
s/p coy Vela    S: Pt has no complaints. Denies CP, SOB, RYAN, calf tenderness. Pain controlled with medication.    O:  T(C): 36.9 (05-22-23 @ 22:25), Max: 37.4 (05-22-23 @ 21:40)  T(F): 98.4 (05-22-23 @ 22:25), Max: 99.3 (05-22-23 @ 21:40)  HR: 83 (05-22-23 @ 22:25) (82 - 102)  BP: 145/81 (05-22-23 @ 22:25) (130/79 - 151/84)  RR: 15 (05-22-23 @ 22:25) (14 - 20)  SpO2: 94% (05-22-23 @ 22:25) (94% - 97%)  Wt(kg): --                        13.7   16.39 )-----------( 215      ( 22 May 2023 06:48 )             41.1     05-22    142  |  109<H>  |  22  ----------------------------<  119<H>  3.3<L>   |  27  |  0.86    Ca    8.9      22 May 2023 06:48  Mg     1.9     05-21    TPro  6.6  /  Alb  3.2<L>  /  TBili  1.1  /  DBili  x   /  AST  21  /  ALT  32  /  AlkPhos  55  05-22      Gen: NAD, resting comfortably in bed  C/V: NSR  Pulm: Nonlabored breathing, no respiratory distress  Abd: abdomen is soft, nontender, non distended, no rebound or guarding, incisions are clean dry and intact   Extrem: WWP, no calf edema, SCDs in place      
SURGERY PA NOTE ON BEHALF OF DR. VELA:    S: Patient seen and examined at bedside.   Reports abdominal soreness following surgery.  Tolerating clear liquid diet.  Denies fevers, chills, chest pain, SOB, palpitations, calf pain.      MEDICATIONS:  acetaminophen     Tablet .. 650 milliGRAM(s) Oral every 6 hours PRN  dextrose 5% + sodium chloride 0.45%. 1000 milliLiter(s) IV Continuous <Continuous>  heparin   Injectable 5000 Unit(s) SubCutaneous every 8 hours  meropenem  IVPB 1000 milliGRAM(s) IV Intermittent every 8 hours  morphine  - Injectable 2 milliGRAM(s) IV Push every 4 hours PRN  ondansetron Injectable 4 milliGRAM(s) IV Push every 6 hours PRN  oxyCODONE    IR 5 milliGRAM(s) Oral every 6 hours PRN      O:  Vital Signs Last 24 Hrs  T(C): 36.8 (23 May 2023 04:57), Max: 38.5 (22 May 2023 18:08)  T(F): 98.3 (23 May 2023 04:57), Max: 101.3 (22 May 2023 18:08)  HR: 71 (23 May 2023 04:57) (69 - 102)  BP: 106/64 (23 May 2023 04:57) (106/64 - 151/84)  BP(mean): --  RR: 18 (23 May 2023 04:57) (14 - 20)  SpO2: 91% (23 May 2023 04:57) (91% - 97%)    Parameters below as of 23 May 2023 04:57  Patient On (Oxygen Delivery Method): room air        I&O SUMMARY:    05-22-23 @ 07:01  -  05-23-23 @ 07:00  --------------------------------------------------------  IN: 1125 mL / OUT: 300 mL / NET: 825 mL        PHYSICAL EXAM:  Lungs: CTA bilat without W/R/R  Card: S1S2  Abd: Soft, NT, ND.  +BS x 4.  No rebound/guarding.  Abdominal incisions c/d/I.    Ext: Calves soft, NT, without edema bilat    LABS:                        12.7   14.03 )-----------( 205      ( 23 May 2023 05:58 )             37.7     05-23    141  |  108  |  21  ----------------------------<  150<H>  3.7   |  29  |  0.90    Ca    8.8      23 May 2023 05:58  Phos  1.0     05-23  Mg     2.2     05-23    TPro  6.3  /  Alb  2.9<L>  /  TBili  0.8  /  DBili  x   /  AST  54<H>  /  ALT  56  /  AlkPhos  52  05-23    PT/INR - ( 22 May 2023 16:35 )   PT: 13.1 sec;   INR: 1.12 ratio         PTT - ( 22 May 2023 16:35 )  PTT:27.8 sec        Assessment:  63 year old male s/p robotic assisted lap raad POD #1 progressing well.  VSS.  Leukocytosis noted (14k), likely SIRS.  Abdominal exam consistent with post-operative state.        Plan:  - Regular diet  - ID follow up for PO abx rec's  - Encouraged IS use/OOB/Ambulation  - D/C planning for later today  - Discussed with Dr. Vela    
pt seen  increased pain  no nausea/vomiting  ICU Vital Signs Last 24 Hrs  T(C): 37 (22 May 2023 04:56), Max: 37 (22 May 2023 04:56)  T(F): 98.6 (22 May 2023 04:56), Max: 98.6 (22 May 2023 04:56)  HR: 73 (22 May 2023 04:56) (71 - 79)  BP: 117/73 (22 May 2023 04:56) (117/73 - 133/89)  BP(mean): --  ABP: --  ABP(mean): --  RR: 18 (22 May 2023 04:56) (18 - 20)  SpO2: 94% (22 May 2023 04:56) (94% - 98%)    O2 Parameters below as of 22 May 2023 04:56  Patient On (Oxygen Delivery Method): room air        gen-NAD  resp-clear  abd-soft ND, RUQ tenderness                          13.7   16.39 )-----------( 215      ( 22 May 2023 06:48 )             41.1

## 2023-05-23 NOTE — DISCHARGE NOTE PROVIDER - HOSPITAL COURSE
HPI:   This is a 63 year old M pmhx HTN, no significant surgical history, presenting with complaint of approx 12h of worsening RUQ pain, localized without radiation. Has not tried any analgesia or therapeutic measures for relief. Onset was a few hours after eating dinner which was his last meal. Unable to tolerate PO as of this morning 2/t nausea and multiple episodes of bilious, non-bloody vomiting. He denies ever experiencing symptoms such as these in the past. He has seen a GI doctor in the past, reports almost 10 years ago for an endoscopy and colonoscopy which he reports both were "normal." Continues to pass gas and have subjectively normal BMs. Denies fever/chills/sweats.     Hospital Course:  CT imaging was done showing gallstones, patient was admitted for further workup, continued to have pain and an uptrending white count. A HIDA scan was done which was positive and the patient was taken to the OR for a Robot-Assisted Cholecystectomy. Intraop the gallbladder was noted to be gangrenous, he stayed overnight for continued IV antibiotics. He tolerated the procedure well and his post operative course was uncomplicated. Today he is feeling well, VS and blood work are within normal limits.  HIS pain is well controlled on oral pain medication.  HE is tolerating diet without nausea, and ambulating independently.  Patient is stable and ready for discharge today with outpatient follow up with Dr. Vela in one week.

## 2023-05-24 LAB — SURGICAL PATHOLOGY STUDY: SIGNIFICANT CHANGE UP

## 2023-05-24 RX ORDER — METRONIDAZOLE 500 MG
1 TABLET ORAL
Qty: 15 | Refills: 0
Start: 2023-05-24 | End: 2023-05-28

## 2023-05-24 RX ORDER — CEFPODOXIME PROXETIL 100 MG
1 TABLET ORAL
Qty: 10 | Refills: 0
Start: 2023-05-24 | End: 2023-05-28

## 2024-05-06 PROBLEM — I10 ESSENTIAL (PRIMARY) HYPERTENSION: Chronic | Status: ACTIVE | Noted: 2023-05-21

## 2024-05-22 ENCOUNTER — NON-APPOINTMENT (OUTPATIENT)
Age: 64
End: 2024-05-22

## 2024-05-22 ENCOUNTER — APPOINTMENT (OUTPATIENT)
Dept: OTOLARYNGOLOGY | Facility: CLINIC | Age: 64
End: 2024-05-22
Payer: COMMERCIAL

## 2024-05-22 VITALS
BODY MASS INDEX: 32.18 KG/M2 | SYSTOLIC BLOOD PRESSURE: 116 MMHG | DIASTOLIC BLOOD PRESSURE: 78 MMHG | WEIGHT: 205 LBS | HEART RATE: 71 BPM | HEIGHT: 67 IN

## 2024-05-22 DIAGNOSIS — H69.90 UNSPECIFIED EUSTACHIAN TUBE DISORDER, UNSPECIFIED EAR: ICD-10-CM

## 2024-05-22 PROCEDURE — 92557 COMPREHENSIVE HEARING TEST: CPT

## 2024-05-22 PROCEDURE — 92567 TYMPANOMETRY: CPT

## 2024-05-22 PROCEDURE — 99203 OFFICE O/P NEW LOW 30 MIN: CPT | Mod: 25

## 2024-05-22 PROCEDURE — 31231 NASAL ENDOSCOPY DX: CPT | Mod: 52

## 2024-05-22 RX ORDER — FINASTERIDE 1 MG/1
TABLET ORAL
Refills: 0 | Status: ACTIVE | COMMUNITY

## 2024-05-22 NOTE — REVIEW OF SYSTEMS
[Ear Pain] : ear pain [Ear Itch] : ear itch [Recurrent Ear Infections] : recurrent ear infections [Problem Snoring] : problem snoring [Negative] : Heme/Lymph [Patient Intake Form Reviewed] : Patient intake form was reviewed

## 2024-05-22 NOTE — PHYSICAL EXAM
[Normal] : mucosa is normal [Midline] : trachea located in midline position [de-identified] : RETRACTED RIGHT

## (undated) DEVICE — DRSG DERMABOND 0.7ML

## (undated) DEVICE — DRAPE 3/4 SHEET W REINFORCEMENT 56X77"

## (undated) DEVICE — SMOKE EVACUTATION SYS LAPROSCOPIC AC/PA

## (undated) DEVICE — SOL IRR POUR NS 0.9% 1000ML

## (undated) DEVICE — XI ENDOWRIST 12 - 8 MM CANNULA REDUCER

## (undated) DEVICE — SOL IRR BAG NS 0.9% 3000ML

## (undated) DEVICE — ELCTR BOVIE TIP BLADE INSULATED 2.75" EDGE

## (undated) DEVICE — XI DRAPE ARM

## (undated) DEVICE — TUBING STRYKER PNEUMOCLEAR HIGH FLOW

## (undated) DEVICE — XI SEAL UNIV 5- 8 MM

## (undated) DEVICE — D HELP - CLEARVIEW CLEARIFY SYSTEM

## (undated) DEVICE — DRAPE TOWEL BLUE 17" X 24"

## (undated) DEVICE — PLV/PSP-SUCTION IRRIGATOR STRYKER: Type: DURABLE MEDICAL EQUIPMENT

## (undated) DEVICE — TROCAR COVIDIEN VERSAPORT BLADELESS OPTICAL 5MM STANDARD

## (undated) DEVICE — SUT MONOCRYL 4-0 27" PS-2 UNDYED

## (undated) DEVICE — SOL IRR POUR H2O 1000ML

## (undated) DEVICE — TROCAR COVIDIEN VERSAONE FIXATION CANNULA 5MM

## (undated) DEVICE — WARMING BLANKET UPPER ADULT

## (undated) DEVICE — GLV 7.5 PROTEXIS (WHITE)

## (undated) DEVICE — VENODYNE/SCD SLEEVE CALF LARGE

## (undated) DEVICE — DRSG CURITY GAUZE SPONGE 4 X 4" 12-PLY

## (undated) DEVICE — SUT POLYSORB 0 30" GU-46

## (undated) DEVICE — TROCAR COVIDIEN VERSAONE BLUNT TIP HASSAN 12MM

## (undated) DEVICE — XI 12MM AND STAPLER CANNULA SEAL

## (undated) DEVICE — XI DRAPE COLUMN

## (undated) DEVICE — Device

## (undated) DEVICE — NDL HYPO REGULAR BEVEL 25G X 1.5" (BLUE)

## (undated) DEVICE — PLV-SCD MACHINE: Type: DURABLE MEDICAL EQUIPMENT

## (undated) DEVICE — STAPLER SKIN PROXIMATE

## (undated) DEVICE — PLV/PSP-ESU T7E14761DX: Type: DURABLE MEDICAL EQUIPMENT

## (undated) DEVICE — VENODYNE/SCD SLEEVE CALF MEDIUM

## (undated) DEVICE — SUT POLYSORB 0 30" GU-45

## (undated) DEVICE — TUBING STRYKER PNEUMOSURE HEATED RTP

## (undated) DEVICE — ENDOCATCH 10MM SPECIMEN POUCH

## (undated) DEVICE — XI OBTURATOR OPTICAL BLADELESS 8MM

## (undated) DEVICE — PACK GENERAL LAPAROSCOPY

## (undated) DEVICE — ELCTR BOVIE PENCIL SMOKE EVACUATION

## (undated) DEVICE — SOL IRR BAG NS 0.9% 1000ML

## (undated) DEVICE — SPONGE ENDO PEANUT 5MM

## (undated) DEVICE — BLADE SCALPEL SAFETYLOCK #15

## (undated) DEVICE — XI ENDOWRIST SUCTION IRRIGATOR 8MM